# Patient Record
Sex: MALE | Race: WHITE | NOT HISPANIC OR LATINO | Employment: OTHER | URBAN - METROPOLITAN AREA
[De-identification: names, ages, dates, MRNs, and addresses within clinical notes are randomized per-mention and may not be internally consistent; named-entity substitution may affect disease eponyms.]

---

## 2024-11-09 ENCOUNTER — HOSPITAL ENCOUNTER (INPATIENT)
Facility: MEDICAL CENTER | Age: 64
LOS: 6 days | End: 2024-11-15
Attending: INTERNAL MEDICINE | Admitting: INTERNAL MEDICINE
Payer: MEDICARE

## 2024-11-09 ENCOUNTER — HOSPITAL ENCOUNTER (OUTPATIENT)
Dept: RADIOLOGY | Facility: MEDICAL CENTER | Age: 64
End: 2024-11-09

## 2024-11-09 ENCOUNTER — HOSPITAL ENCOUNTER (EMERGENCY)
Facility: MEDICAL CENTER | Age: 64
End: 2024-11-09

## 2024-11-09 DIAGNOSIS — I63.9 ACUTE STROKE DUE TO ISCHEMIA (HCC): ICD-10-CM

## 2024-11-09 DIAGNOSIS — I65.22 STENOSIS OF LEFT INTERNAL CAROTID ARTERY: ICD-10-CM

## 2024-11-09 PROBLEM — F17.200 SMOKING: Status: ACTIVE | Noted: 2024-11-09

## 2024-11-09 PROBLEM — D75.1 POLYCYTHEMIA: Status: ACTIVE | Noted: 2024-11-09

## 2024-11-09 PROBLEM — I10 PRIMARY HYPERTENSION: Status: ACTIVE | Noted: 2024-11-09

## 2024-11-09 LAB — EKG IMPRESSION: NORMAL

## 2024-11-09 PROCEDURE — 700102 HCHG RX REV CODE 250 W/ 637 OVERRIDE(OP)

## 2024-11-09 PROCEDURE — 770020 HCHG ROOM/CARE - TELE (206)

## 2024-11-09 PROCEDURE — 99406 BEHAV CHNG SMOKING 3-10 MIN: CPT | Performed by: STUDENT IN AN ORGANIZED HEALTH CARE EDUCATION/TRAINING PROGRAM

## 2024-11-09 PROCEDURE — 99223 1ST HOSP IP/OBS HIGH 75: CPT | Mod: 25,AI | Performed by: STUDENT IN AN ORGANIZED HEALTH CARE EDUCATION/TRAINING PROGRAM

## 2024-11-09 PROCEDURE — 700111 HCHG RX REV CODE 636 W/ 250 OVERRIDE (IP): Mod: JZ | Performed by: STUDENT IN AN ORGANIZED HEALTH CARE EDUCATION/TRAINING PROGRAM

## 2024-11-09 PROCEDURE — A9270 NON-COVERED ITEM OR SERVICE: HCPCS

## 2024-11-09 PROCEDURE — 93005 ELECTROCARDIOGRAM TRACING: CPT | Performed by: STUDENT IN AN ORGANIZED HEALTH CARE EDUCATION/TRAINING PROGRAM

## 2024-11-09 PROCEDURE — 700102 HCHG RX REV CODE 250 W/ 637 OVERRIDE(OP): Performed by: STUDENT IN AN ORGANIZED HEALTH CARE EDUCATION/TRAINING PROGRAM

## 2024-11-09 PROCEDURE — 93010 ELECTROCARDIOGRAM REPORT: CPT | Performed by: INTERNAL MEDICINE

## 2024-11-09 PROCEDURE — A9270 NON-COVERED ITEM OR SERVICE: HCPCS | Performed by: STUDENT IN AN ORGANIZED HEALTH CARE EDUCATION/TRAINING PROGRAM

## 2024-11-09 RX ORDER — MORPHINE SULFATE 4 MG/ML
2 INJECTION INTRAVENOUS
Status: DISCONTINUED | OUTPATIENT
Start: 2024-11-09 | End: 2024-11-15 | Stop reason: HOSPADM

## 2024-11-09 RX ORDER — AMLODIPINE BESYLATE 5 MG/1
5 TABLET ORAL
Status: DISCONTINUED | OUTPATIENT
Start: 2024-11-10 | End: 2024-11-09

## 2024-11-09 RX ORDER — ATORVASTATIN CALCIUM 40 MG/1
40 TABLET, FILM COATED ORAL EVERY EVENING
Status: DISCONTINUED | OUTPATIENT
Start: 2024-11-09 | End: 2024-11-15 | Stop reason: HOSPADM

## 2024-11-09 RX ORDER — LORAZEPAM 2 MG/ML
0.5 INJECTION INTRAMUSCULAR
Status: COMPLETED | OUTPATIENT
Start: 2024-11-09 | End: 2024-11-10

## 2024-11-09 RX ORDER — ASPIRIN 81 MG/1
81 TABLET, CHEWABLE ORAL DAILY
Status: DISCONTINUED | OUTPATIENT
Start: 2024-11-10 | End: 2024-11-15 | Stop reason: HOSPADM

## 2024-11-09 RX ORDER — AMOXICILLIN 250 MG
2 CAPSULE ORAL EVERY EVENING
Status: DISCONTINUED | OUTPATIENT
Start: 2024-11-09 | End: 2024-11-15 | Stop reason: HOSPADM

## 2024-11-09 RX ORDER — HYDRALAZINE HYDROCHLORIDE 20 MG/ML
10 INJECTION INTRAMUSCULAR; INTRAVENOUS EVERY 4 HOURS PRN
Status: DISCONTINUED | OUTPATIENT
Start: 2024-11-09 | End: 2024-11-15 | Stop reason: HOSPADM

## 2024-11-09 RX ORDER — POLYETHYLENE GLYCOL 3350 17 G/17G
1 POWDER, FOR SOLUTION ORAL
Status: DISCONTINUED | OUTPATIENT
Start: 2024-11-09 | End: 2024-11-15 | Stop reason: HOSPADM

## 2024-11-09 RX ORDER — ENOXAPARIN SODIUM 100 MG/ML
40 INJECTION SUBCUTANEOUS DAILY
Status: DISCONTINUED | OUTPATIENT
Start: 2024-11-10 | End: 2024-11-09

## 2024-11-09 RX ORDER — ASPIRIN 300 MG/1
300 SUPPOSITORY RECTAL DAILY
Status: DISCONTINUED | OUTPATIENT
Start: 2024-11-10 | End: 2024-11-15 | Stop reason: HOSPADM

## 2024-11-09 RX ORDER — ACETAMINOPHEN 325 MG/1
650 TABLET ORAL EVERY 6 HOURS PRN
Status: DISCONTINUED | OUTPATIENT
Start: 2024-11-09 | End: 2024-11-15 | Stop reason: HOSPADM

## 2024-11-09 RX ORDER — HYDRALAZINE HYDROCHLORIDE 20 MG/ML
10 INJECTION INTRAMUSCULAR; INTRAVENOUS EVERY 6 HOURS PRN
Status: DISCONTINUED | OUTPATIENT
Start: 2024-11-09 | End: 2024-11-09

## 2024-11-09 RX ORDER — OXYCODONE HYDROCHLORIDE 5 MG/1
5 TABLET ORAL
Status: DISCONTINUED | OUTPATIENT
Start: 2024-11-09 | End: 2024-11-15 | Stop reason: HOSPADM

## 2024-11-09 RX ORDER — NICOTINE 21 MG/24HR
21 PATCH, TRANSDERMAL 24 HOURS TRANSDERMAL
Status: DISCONTINUED | OUTPATIENT
Start: 2024-11-09 | End: 2024-11-15 | Stop reason: HOSPADM

## 2024-11-09 RX ORDER — OXYCODONE HYDROCHLORIDE 5 MG/1
2.5 TABLET ORAL
Status: DISCONTINUED | OUTPATIENT
Start: 2024-11-09 | End: 2024-11-15 | Stop reason: HOSPADM

## 2024-11-09 RX ORDER — NICOTINE 21 MG/24HR
21 PATCH, TRANSDERMAL 24 HOURS TRANSDERMAL
Status: DISCONTINUED | OUTPATIENT
Start: 2024-11-10 | End: 2024-11-09

## 2024-11-09 RX ORDER — ONDANSETRON 4 MG/1
4 TABLET, ORALLY DISINTEGRATING ORAL EVERY 4 HOURS PRN
Status: DISCONTINUED | OUTPATIENT
Start: 2024-11-09 | End: 2024-11-15 | Stop reason: HOSPADM

## 2024-11-09 RX ORDER — ONDANSETRON 2 MG/ML
4 INJECTION INTRAMUSCULAR; INTRAVENOUS EVERY 4 HOURS PRN
Status: DISCONTINUED | OUTPATIENT
Start: 2024-11-09 | End: 2024-11-15 | Stop reason: HOSPADM

## 2024-11-09 RX ADMIN — HYDRALAZINE HYDROCHLORIDE 10 MG: 20 INJECTION INTRAMUSCULAR; INTRAVENOUS at 20:20

## 2024-11-09 RX ADMIN — NICOTINE TRANSDERMAL SYSTEM 21 MG: 21 PATCH, EXTENDED RELEASE TRANSDERMAL at 22:44

## 2024-11-09 RX ADMIN — OXYCODONE 5 MG: 5 TABLET ORAL at 22:00

## 2024-11-09 RX ADMIN — ATORVASTATIN CALCIUM 40 MG: 40 TABLET, FILM COATED ORAL at 20:20

## 2024-11-09 ASSESSMENT — ENCOUNTER SYMPTOMS
FOCAL WEAKNESS: 1
WEAKNESS: 1

## 2024-11-09 ASSESSMENT — PAIN DESCRIPTION - PAIN TYPE
TYPE: ACUTE PAIN
TYPE: ACUTE PAIN

## 2024-11-09 NOTE — PROGRESS NOTES
[facility] DIRECT ADMISSION REPORT  Transferring facility: [place]  Transferring physician: Dr. Villa    Chief complaint: Right-sided weakness  Pertinent history & patient course: Patient began noticing right-sided weakness on Wednesday, did not come into the ER until today.  Patient has been noncompliant with his medications since his wife  a couple of months ago.  Workup for stroke was started, he was noted to have critical stenosis of his left common carotid on CTA neck.  Patient did not receive any thrombolytics due to time away from initial injury.  Patient is being transferred here for neurology consultation as well as additional workup.  Pertinent imaging & lab results: CTA neck-critical stenosis of the left common carotid artery  Consultants called prior to transfer and pertinent input from consultants: N/A  Code Status: Full per transferring provider, I personally verified with the transferring provider patient's code status and the transferring provider has confirmed this with the patient.  Reason for Transfer: Neuroconsultation and additional workup  Further work up or recommendations requested prior to transfer: N/A    Patient accepted for transfer: Yes  Accepting [facility] Facility: [facility] - Nursing to notify the Triage Coordinator in the RTOC via Voalte or Phone ext. [phone] when patient arrives to the unit. The Triage Coordinator will assign the admitting provider.    Consultants to be called upon arrival: Neurology will need to be consulted but if the patient arrives overnight, no need for overnight consultation  Admission status: Inpatient.   Floor requested: Neurology  If ICU transfer, name of intensivist case discussed with and pertinent input from critical care: N/A    The admitting provider is the point of contact for questions or concerns regarding patient's care.

## 2024-11-10 ENCOUNTER — APPOINTMENT (OUTPATIENT)
Dept: RADIOLOGY | Facility: MEDICAL CENTER | Age: 64
End: 2024-11-10
Attending: STUDENT IN AN ORGANIZED HEALTH CARE EDUCATION/TRAINING PROGRAM
Payer: MEDICARE

## 2024-11-10 PROBLEM — I65.22 STENOSIS OF LEFT INTERNAL CAROTID ARTERY: Status: ACTIVE | Noted: 2024-11-10

## 2024-11-10 LAB
ANION GAP SERPL CALC-SCNC: 9 MMOL/L (ref 7–16)
BUN SERPL-MCNC: 7 MG/DL (ref 8–22)
CALCIUM SERPL-MCNC: 10.5 MG/DL (ref 8.5–10.5)
CHLORIDE SERPL-SCNC: 104 MMOL/L (ref 96–112)
CHOLEST SERPL-MCNC: 161 MG/DL (ref 100–199)
CO2 SERPL-SCNC: 24 MMOL/L (ref 20–33)
CREAT SERPL-MCNC: 1 MG/DL (ref 0.5–1.4)
ERYTHROCYTE [DISTWIDTH] IN BLOOD BY AUTOMATED COUNT: 55.8 FL (ref 35.9–50)
EST. AVERAGE GLUCOSE BLD GHB EST-MCNC: 108 MG/DL
GFR SERPLBLD CREATININE-BSD FMLA CKD-EPI: 84 ML/MIN/1.73 M 2
GLUCOSE SERPL-MCNC: 107 MG/DL (ref 65–99)
HBA1C MFR BLD: 5.4 % (ref 4–5.6)
HCT VFR BLD AUTO: 48.6 % (ref 42–52)
HDLC SERPL-MCNC: 37 MG/DL
HGB BLD-MCNC: 16.4 G/DL (ref 14–18)
LDLC SERPL CALC-MCNC: 96 MG/DL
MCH RBC QN AUTO: 33.1 PG (ref 27–33)
MCHC RBC AUTO-ENTMCNC: 33.7 G/DL (ref 32.3–36.5)
MCV RBC AUTO: 98.2 FL (ref 81.4–97.8)
PLATELET # BLD AUTO: 123 K/UL (ref 164–446)
PMV BLD AUTO: 11.8 FL (ref 9–12.9)
POTASSIUM SERPL-SCNC: 4.5 MMOL/L (ref 3.6–5.5)
RBC # BLD AUTO: 4.95 M/UL (ref 4.7–6.1)
SODIUM SERPL-SCNC: 137 MMOL/L (ref 135–145)
TRIGL SERPL-MCNC: 138 MG/DL (ref 0–149)
WBC # BLD AUTO: 6.6 K/UL (ref 4.8–10.8)

## 2024-11-10 PROCEDURE — 80061 LIPID PANEL: CPT

## 2024-11-10 PROCEDURE — A9270 NON-COVERED ITEM OR SERVICE: HCPCS | Performed by: PSYCHIATRY & NEUROLOGY

## 2024-11-10 PROCEDURE — 85027 COMPLETE CBC AUTOMATED: CPT

## 2024-11-10 PROCEDURE — 700102 HCHG RX REV CODE 250 W/ 637 OVERRIDE(OP): Performed by: PSYCHIATRY & NEUROLOGY

## 2024-11-10 PROCEDURE — 70551 MRI BRAIN STEM W/O DYE: CPT

## 2024-11-10 PROCEDURE — 99222 1ST HOSP IP/OBS MODERATE 55: CPT | Performed by: PSYCHIATRY & NEUROLOGY

## 2024-11-10 PROCEDURE — 700111 HCHG RX REV CODE 636 W/ 250 OVERRIDE (IP): Performed by: NURSE PRACTITIONER

## 2024-11-10 PROCEDURE — 36415 COLL VENOUS BLD VENIPUNCTURE: CPT

## 2024-11-10 PROCEDURE — 700102 HCHG RX REV CODE 250 W/ 637 OVERRIDE(OP): Performed by: STUDENT IN AN ORGANIZED HEALTH CARE EDUCATION/TRAINING PROGRAM

## 2024-11-10 PROCEDURE — 83036 HEMOGLOBIN GLYCOSYLATED A1C: CPT

## 2024-11-10 PROCEDURE — 700111 HCHG RX REV CODE 636 W/ 250 OVERRIDE (IP): Mod: JZ

## 2024-11-10 PROCEDURE — 80048 BASIC METABOLIC PNL TOTAL CA: CPT

## 2024-11-10 PROCEDURE — A9270 NON-COVERED ITEM OR SERVICE: HCPCS | Performed by: STUDENT IN AN ORGANIZED HEALTH CARE EDUCATION/TRAINING PROGRAM

## 2024-11-10 PROCEDURE — 99406 BEHAV CHNG SMOKING 3-10 MIN: CPT | Performed by: STUDENT IN AN ORGANIZED HEALTH CARE EDUCATION/TRAINING PROGRAM

## 2024-11-10 PROCEDURE — 770020 HCHG ROOM/CARE - TELE (206)

## 2024-11-10 PROCEDURE — 99233 SBSQ HOSP IP/OBS HIGH 50: CPT | Mod: 25 | Performed by: STUDENT IN AN ORGANIZED HEALTH CARE EDUCATION/TRAINING PROGRAM

## 2024-11-10 RX ORDER — AMLODIPINE BESYLATE 5 MG/1
5 TABLET ORAL
Status: DISCONTINUED | OUTPATIENT
Start: 2024-11-10 | End: 2024-11-10

## 2024-11-10 RX ORDER — CLOPIDOGREL BISULFATE 75 MG/1
150 TABLET ORAL DAILY
Status: DISCONTINUED | OUTPATIENT
Start: 2024-11-10 | End: 2024-11-10

## 2024-11-10 RX ORDER — CLOPIDOGREL BISULFATE 75 MG/1
150 TABLET ORAL DAILY
Status: COMPLETED | OUTPATIENT
Start: 2024-11-10 | End: 2024-11-11

## 2024-11-10 RX ORDER — AMLODIPINE BESYLATE 5 MG/1
5 TABLET ORAL DAILY
Status: DISCONTINUED | OUTPATIENT
Start: 2024-11-10 | End: 2024-11-12

## 2024-11-10 RX ORDER — CLOPIDOGREL BISULFATE 75 MG/1
75 TABLET ORAL DAILY
Status: DISCONTINUED | OUTPATIENT
Start: 2024-11-12 | End: 2024-11-15 | Stop reason: HOSPADM

## 2024-11-10 RX ADMIN — OXYCODONE 2.5 MG: 5 TABLET ORAL at 05:03

## 2024-11-10 RX ADMIN — OXYCODONE 5 MG: 5 TABLET ORAL at 12:36

## 2024-11-10 RX ADMIN — HYDRALAZINE HYDROCHLORIDE 10 MG: 20 INJECTION INTRAMUSCULAR; INTRAVENOUS at 09:32

## 2024-11-10 RX ADMIN — ATORVASTATIN CALCIUM 40 MG: 40 TABLET, FILM COATED ORAL at 16:35

## 2024-11-10 RX ADMIN — CLOPIDOGREL BISULFATE 150 MG: 75 TABLET ORAL at 11:56

## 2024-11-10 RX ADMIN — HYDRALAZINE HYDROCHLORIDE 10 MG: 20 INJECTION INTRAMUSCULAR; INTRAVENOUS at 04:39

## 2024-11-10 RX ADMIN — AMLODIPINE BESYLATE 5 MG: 5 TABLET ORAL at 08:30

## 2024-11-10 RX ADMIN — OXYCODONE 5 MG: 5 TABLET ORAL at 20:03

## 2024-11-10 RX ADMIN — OXYCODONE 2.5 MG: 5 TABLET ORAL at 09:33

## 2024-11-10 RX ADMIN — LORAZEPAM 0.5 MG: 2 INJECTION INTRAMUSCULAR; INTRAVENOUS at 00:09

## 2024-11-10 RX ADMIN — ASPIRIN 81 MG: 81 TABLET, CHEWABLE ORAL at 05:04

## 2024-11-10 SDOH — ECONOMIC STABILITY: TRANSPORTATION INSECURITY
IN THE PAST 12 MONTHS, HAS LACK OF RELIABLE TRANSPORTATION KEPT YOU FROM MEDICAL APPOINTMENTS, MEETINGS, WORK OR FROM GETTING THINGS NEEDED FOR DAILY LIVING?: NO

## 2024-11-10 SDOH — ECONOMIC STABILITY: TRANSPORTATION INSECURITY
IN THE PAST 12 MONTHS, HAS THE LACK OF TRANSPORTATION KEPT YOU FROM MEDICAL APPOINTMENTS OR FROM GETTING MEDICATIONS?: NO

## 2024-11-10 ASSESSMENT — PATIENT HEALTH QUESTIONNAIRE - PHQ9
8. MOVING OR SPEAKING SO SLOWLY THAT OTHER PEOPLE COULD HAVE NOTICED. OR THE OPPOSITE, BEING SO FIGETY OR RESTLESS THAT YOU HAVE BEEN MOVING AROUND A LOT MORE THAN USUAL: MORE THAN HALF THE DAYS
4. FEELING TIRED OR HAVING LITTLE ENERGY: NEARLY EVERY DAY
9. THOUGHTS THAT YOU WOULD BE BETTER OFF DEAD, OR OF HURTING YOURSELF: NOT AT ALL
5. POOR APPETITE OR OVEREATING: SEVERAL DAYS
7. TROUBLE CONCENTRATING ON THINGS, SUCH AS READING THE NEWSPAPER OR WATCHING TELEVISION: NEARLY EVERY DAY
SUM OF ALL RESPONSES TO PHQ9 QUESTIONS 1 AND 2: 6
6. FEELING BAD ABOUT YOURSELF - OR THAT YOU ARE A FAILURE OR HAVE LET YOURSELF OR YOUR FAMILY DOWN: SEVERAL DAYS
1. LITTLE INTEREST OR PLEASURE IN DOING THINGS: NEARLY EVERY DAY
SUM OF ALL RESPONSES TO PHQ QUESTIONS 1-9: 19
3. TROUBLE FALLING OR STAYING ASLEEP OR SLEEPING TOO MUCH: NEARLY EVERY DAY
2. FEELING DOWN, DEPRESSED, IRRITABLE, OR HOPELESS: NEARLY EVERY DAY

## 2024-11-10 ASSESSMENT — PAIN DESCRIPTION - PAIN TYPE
TYPE: ACUTE PAIN

## 2024-11-10 ASSESSMENT — COGNITIVE AND FUNCTIONAL STATUS - GENERAL
DAILY ACTIVITIY SCORE: 21
SUGGESTED CMS G CODE MODIFIER DAILY ACTIVITY: CJ
DRESSING REGULAR UPPER BODY CLOTHING: A LITTLE
MOBILITY SCORE: 24
SUGGESTED CMS G CODE MODIFIER MOBILITY: CH
DRESSING REGULAR LOWER BODY CLOTHING: A LITTLE
HELP NEEDED FOR BATHING: A LITTLE

## 2024-11-10 ASSESSMENT — SOCIAL DETERMINANTS OF HEALTH (SDOH)
WITHIN THE PAST 12 MONTHS, YOU WORRIED THAT YOUR FOOD WOULD RUN OUT BEFORE YOU GOT THE MONEY TO BUY MORE: NEVER TRUE
WITHIN THE LAST YEAR, HAVE YOU BEEN AFRAID OF YOUR PARTNER OR EX-PARTNER?: NO
WITHIN THE LAST YEAR, HAVE YOU BEEN HUMILIATED OR EMOTIONALLY ABUSED IN OTHER WAYS BY YOUR PARTNER OR EX-PARTNER?: NO
WITHIN THE LAST YEAR, HAVE YOU BEEN KICKED, HIT, SLAPPED, OR OTHERWISE PHYSICALLY HURT BY YOUR PARTNER OR EX-PARTNER?: NO
WITHIN THE PAST 12 MONTHS, THE FOOD YOU BOUGHT JUST DIDN'T LAST AND YOU DIDN'T HAVE MONEY TO GET MORE: SOMETIMES TRUE
IN THE PAST 12 MONTHS, HAS THE ELECTRIC, GAS, OIL, OR WATER COMPANY THREATENED TO SHUT OFF SERVICE IN YOUR HOME?: NO
WITHIN THE LAST YEAR, HAVE TO BEEN RAPED OR FORCED TO HAVE ANY KIND OF SEXUAL ACTIVITY BY YOUR PARTNER OR EX-PARTNER?: NO

## 2024-11-10 ASSESSMENT — LIFESTYLE VARIABLES
EVER HAD A DRINK FIRST THING IN THE MORNING TO STEADY YOUR NERVES TO GET RID OF A HANGOVER: NO
AVERAGE NUMBER OF DAYS PER WEEK YOU HAVE A DRINK CONTAINING ALCOHOL: 0
ON A TYPICAL DAY WHEN YOU DRINK ALCOHOL HOW MANY DRINKS DO YOU HAVE: 0
ALCOHOL_USE: NO
HAVE YOU EVER FELT YOU SHOULD CUT DOWN ON YOUR DRINKING: NO
EVER FELT BAD OR GUILTY ABOUT YOUR DRINKING: NO
HOW MANY TIMES IN THE PAST YEAR HAVE YOU HAD 5 OR MORE DRINKS IN A DAY: 0
DOES PATIENT WANT TO STOP DRINKING: CANNOT ASSESS
HAVE PEOPLE ANNOYED YOU BY CRITICIZING YOUR DRINKING: NO
TOTAL SCORE: 0
CONSUMPTION TOTAL: NEGATIVE

## 2024-11-10 ASSESSMENT — ENCOUNTER SYMPTOMS
VOMITING: 0
ABDOMINAL PAIN: 0
FEVER: 0
SHORTNESS OF BREATH: 0
NAUSEA: 0

## 2024-11-10 NOTE — ASSESSMENT & PLAN NOTE
Antihypertensives were initiated but held postprocedure due to shock.  They will be restarted accordingly.

## 2024-11-10 NOTE — PROGRESS NOTES
4 Eyes Skin Assessment Completed by FIOR Cavazos and FIOR Matute.    Head WDL  Ears Redness and Blanching  Nose WDL  Mouth WDL  Neck Redness and Blanching  Breast/Chest Redness and Blanching  Shoulder Blades Redness and Blanching  Spine WDL  (R) Arm/Elbow/Hand WDL  (L) Arm/Elbow/Hand WDL  Abdomen WDL  Groin WDL  Scrotum/Coccyx/Buttocks WDL  (R) Leg WDL  (L) Leg WDL  (R) Heel/Foot/Toe Redness and Blanching, Dry, Flaky  (L) Heel/Foot/Toe Redness and Blanching, Dry, Flaky        Devices In Places Tele Box and Pulse Ox      Interventions In Place N/A    Possible Skin Injury No    Pictures Uploaded Into Epic N/A  Wound Consult Placed N/A  RN Wound Prevention Protocol Ordered No

## 2024-11-10 NOTE — HOSPITAL COURSE
Christopher Brown is a 64-year-old male with PMHx HTN, current tobacco abuse.  Admitted 11/9 for right upper extremity weakness.    Per history-patient presented to an outside facility for right upper extremity weakness that started on Wednesday 11/6.  Patient did not seek medical attention until 11/9.      CTA head showing low-density in the left parieto-occipital region suggesting an area of previous infarction.  CTA head and neck: High-grade stenosis of left distal carotid artery with moderate stenosis of the internal carotid artery.    MRI brain: Acute and subacute left posterior frontal and parietal infarcts.  No definitive evidence of hemorrhagic transformation.    Neurology was consulted upon arrival.  Neurology recommending DAPT therapy for 3 months with an end date of 2/11/2025 and then aspirin as monotherapy to follow.      Patient underwent left ICA stent on 11/12.  Postoperatively patient was monitored in the ICU.  ICU admission was complicated by bradycardia requiring epinephrine drip.  Cardiology was consulted and recommended continue telemetry monitoring.  At this point pacemaker was not recommended as patient's heart block and pauses occur overnight without symptoms.  Encourage patient to avoid beta-blocker therapy in the future as he at baseline has a heart rate of 40-50.    PT and OT recommending outpatient therapies.  Referrals have been placed.  Additionally, patient has been discharged with a 90-day supply of his medications.

## 2024-11-10 NOTE — CARE PLAN
The patient is Stable - Low risk of patient condition declining or worsening    Shift Goals  Clinical Goals: Stable neuro status, pain management  Patient Goals: Pain management  Family Goals: SUE    Progress made toward(s) clinical / shift goals:      Problem: Neuro Status  Goal: Neuro status will remain stable or improve  Outcome: Progressing    Q4H neuro checks in place. Pt's neurological status (A/Ox4) remains unchanged throughout shift. Bed alarm is on, call light within reach.     Problem: Pain - Standard  Goal: Alleviation of pain or a reduction in pain to the patient’s comfort goal  Outcome: Progressing   Frequent pain assessments throughout shift. PRN pain medications provided per MAR and pt request. Pharmacological and non-pharmacological interventions utilized.      Patient is not progressing towards the following goals:

## 2024-11-10 NOTE — H&P
Hospital Medicine History & Physical Note    Date of Service  11/9/2024    Primary Care Physician  No primary care provider on file.    Consultants  neurology    Specialist Names: Dr. Agrawal    Code Status  Full Code    Chief Complaint  RUE weakness       History of Presenting Illness  Christopher Brown is a 64 y.o. male with history of HTN, smoker, who presented 11/9/2024 as a direct admit from [facility] for RUE weakness onset this Wednesday. Patient did not seek medical attention until this morning. He presented to [facility] this morning. Labs remarkable for wbc 12.1, Hb 16.8, plt 121, Na 132, K 4.0, Cr 1.1. Troponin in normal range. UA negative.     CT head notes low density in the L parieto-occipital region suggests an area of previous infarction. CTA neck noted high grade stenosis of the L distal common carotid artery with moderate stenosis of the internal carotid artery. CTA head unremarkable.     Discussed with neurology Dr. Agrawal, BP goal 120-180 systolic. Echo, MRI ordered.     I discussed the plan of care with patient, bedside RN, and neurology .    Review of Systems  Review of Systems   Constitutional:  Positive for malaise/fatigue.   Neurological:  Positive for focal weakness and weakness.   All other systems reviewed and are negative.      Past Medical History   has no past medical history on file.    Surgical History   has no past surgical history on file.     Family History  family history is not on file.   Family history reviewed with patient. There is no family history that is pertinent to the chief complaint.     Social History       Allergies  Not on File    Medications  None       Physical Exam                             Physical Exam  Vitals and nursing note reviewed.   Constitutional:       Appearance: Normal appearance. He is ill-appearing.   HENT:      Head: Normocephalic and atraumatic.      Mouth/Throat:      Pharynx: Oropharynx is clear.   Eyes:       "Pupils: Pupils are equal, round, and reactive to light.   Neck:      Vascular: No carotid bruit.   Cardiovascular:      Rate and Rhythm: Normal rate and regular rhythm.   Pulmonary:      Effort: Pulmonary effort is normal.      Breath sounds: Normal breath sounds.   Abdominal:      General: Abdomen is flat. Bowel sounds are normal.      Palpations: Abdomen is soft. There is no mass.   Musculoskeletal:         General: Normal range of motion.      Cervical back: Neck supple.   Skin:     General: Skin is warm and dry.   Neurological:      Mental Status: He is alert and oriented to person, place, and time.      Comments: RUE: weakness 4/5 in strength with drifting. The other extremities strength intact  Sensation intact in all extremities   Psychiatric:         Mood and Affect: Mood normal.         Behavior: Behavior normal.         Laboratory:          No results for input(s): \"ALTSGPT\", \"ASTSGOT\", \"ALKPHOSPHAT\", \"TBILIRUBIN\", \"DBILIRUBIN\", \"GAMMAGT\", \"AMYLASE\", \"LIPASE\", \"ALB\", \"PREALBUMIN\", \"GLUCOSE\" in the last 72 hours.      No results for input(s): \"NTPROBNP\" in the last 72 hours.      No results for input(s): \"TROPONINT\" in the last 72 hours.    Imaging:  No orders to display       EKG:  I have personally reviewed the images and compared with prior images.    Assessment/Plan:  Justification for Admission Status  I anticipate this patient will require at least two midnights for appropriate medical management, necessitating inpatient admission because critical carotid artery stenosis, CVA requiring neurology evaluation and possible intervention.     Patient will need a Telemetry bed on NEUROLOGY service .  The need is secondary to RUE weakness.    * Acute stroke due to ischemia (HCC)- (present on admission)  Assessment & Plan  Onset on 11/6 Wednesday, presenting to hospital 11/9  CT head notes low density in the L parieto-occipital region suggests an area of previous infarction.   CTA neck noted high grade " stenosis of the L distal common carotid artery with moderate stenosis of the internal carotid artery.   CTA head unremarkable.     Check A1c, lipid profile  Echo with bubble study, MRI  Start ASA, statin  PT/OT/SLP  Discussed with neurology Dr. Agrawal. Keep Sbp goal 120-180    Polycythemia  Assessment & Plan  Hb 16.8 noted on outside labs  Will repeat  likely due to long term smoking    Smoking  Assessment & Plan  5 minutes on tobacco cessation counseling provided including nicotine patches, gum, and dangers of smoking. Discussed the risks of smoking including increased risk of heart disease, stroke, cancer and COPD. Discussed the benefits of quitting smoking.       Primary hypertension  Assessment & Plan  Unclear which home meds he is taking  Hydralazine iv prn if sbp>180        VTE prophylaxis: SCDs/TEDs

## 2024-11-10 NOTE — PROGRESS NOTES
Monitor summary: SB 45-59, CT -0.14, QRS -0.08, QT -0.43, with (F) PVCs and trigeminal PVCs per strip from the monitor room.

## 2024-11-10 NOTE — PROGRESS NOTES
Hospital Medicine Daily Progress Note    Date of Service  11/10/2024    Chief Complaint  Christopher Brown is a 64 y.o. male admitted 11/9/2024 with right upper extremity weakness.    Hospital Course  Christopher Brown is a 64-year-old male with PMHx HTN, current tobacco abuse.  Admitted 11/9 for right upper extremity weakness.    Per history-patient presented to an outside facility for right upper extremity weakness that started on Wednesday 11/6.  Patient did not seek medical attention until 11/9.      CTA head showing low-density in the left parieto-occipital region suggesting an area of previous infarction.  CTA head and neck: High-grade stenosis of left distal carotid artery with moderate stenosis of the internal carotid artery.    MRI brain: Acute and subacute left posterior frontal and parietal infarcts.  No definitive evidence of hemorrhagic transformation.    Neurology was consulted upon arrival.     Interval Problem Update  11/10: Vitals notable for SBP ranging 160-190.  Patient has required 1 dose IV antihypertensives overnight.  Start amlodipine 5 mg daily. Discussed with Dr. Agrawal, neurovascular. Planning for left ICA stenting Tuesday 11/12.    I have discussed this patient's plan of care and discharge plan at IDT rounds today with Case Management, Nursing, Nursing leadership, and other members of the IDT team.    Consultants/Specialty  neurology    Code Status  Full Code    Disposition  The patient is not medically cleared for discharge to home or a post-acute facility.      I have placed the appropriate orders for post-discharge needs.    Review of Systems  Review of Systems   Constitutional:  Negative for fever and malaise/fatigue.   Respiratory:  Negative for shortness of breath.    Cardiovascular:  Negative for chest pain and leg swelling.   Gastrointestinal:  Negative for abdominal pain, nausea and vomiting.        Physical Exam  Temp:  [36.4 °C (97.5 °F)-36.7 °C (98.1 °F)] 36.7 °C (98.1 °F)  Pulse:   [51-70] 70  Resp:  [17-18] 18  BP: (142-199)/(69-98) 142/74  SpO2:  [93 %-98 %] 98 %    Physical Exam  Vitals and nursing note reviewed.   Constitutional:       General: He is not in acute distress.     Appearance: Normal appearance. He is not ill-appearing.   Cardiovascular:      Rate and Rhythm: Normal rate and regular rhythm.      Heart sounds: Murmur heard.   Pulmonary:      Effort: Pulmonary effort is normal.      Breath sounds: Normal breath sounds.   Skin:     General: Skin is warm and dry.   Neurological:      Mental Status: He is alert and oriented to person, place, and time. Mental status is at baseline.      Comments: RUE 3/5 flexion strength; 2/5  strength   Psychiatric:         Mood and Affect: Mood normal.         Behavior: Behavior normal.         Fluids  No intake or output data in the 24 hours ending 11/10/24 1433     Laboratory  Recent Labs     11/10/24  0640   WBC 6.6   RBC 4.95   HEMOGLOBIN 16.4   HEMATOCRIT 48.6   MCV 98.2*   MCH 33.1*   MCHC 33.7   RDW 55.8*   PLATELETCT 123*   MPV 11.8     Recent Labs     11/10/24  0640   SODIUM 137   POTASSIUM 4.5   CHLORIDE 104   CO2 24   GLUCOSE 107*   BUN 7*   CREATININE 1.00   CALCIUM 10.5             Recent Labs     11/10/24  0640   TRIGLYCERIDE 138   HDL 37*   LDL 96       Imaging  MR-BRAIN-W/O   Final Result      1.  Acute to subacute left posterior frontal and parietal infarcts. No definite evidence of hemorrhagic transformation.   2.  Mild diffuse cerebral and cerebellar substance loss.   3.  Mild microangiopathic ischemic change versus demyelination or gliosis.   4.  Chronic ischemic pontine gliosis.      EC-ECHOCARDIOGRAM COMPLETE W/ CONT    (Results Pending)        Assessment/Plan  * Acute stroke due to ischemia (HCC)- (present on admission)  Assessment & Plan  Onset on 11/6 Wednesday, presenting to hospital 11/9  CT head notes low density in the L parieto-occipital region suggests an area of previous infarction.   CTA head and neck noted  high grade stenosis of the L distal common carotid artery with moderate stenosis of the internal carotid artery.   - Continue DAPT with clopdiogrel 150mg until 11/11 and then regular DAPT for 3 months (end date 2/11/2025)  - Continue Atorvastatin  - SBP goal   - Neurology following  - PT OT pending      Stenosis of left internal carotid artery  Assessment & Plan  Neurovascular surgery is following  Discussed with Dr. Agrawal  - Plan for stenting 11/12  - NPO at [place] on 11/11    Polycythemia  Assessment & Plan  Hb 16.4  - Continue to monitor     Smoking  Assessment & Plan  5 minutes on tobacco cessation counseling provided including nicotine patches, gum, and dangers of smoking. Discussed the risks of smoking including increased risk of heart disease, stroke, cancer and COPD. Discussed the benefits of quitting smoking.       Primary hypertension  Assessment & Plan  -190 overnight  - Start amlodpine 5mg daily  - Close monitoring of blood pressures  - IV antihypertensives for SBP >180  - Low threshold to add additional PO antihypertensives          VTE prophylaxis:    enoxaparin ppx      I have performed a physical exam and reviewed and updated ROS and Plan today (11/10/2024). In review of yesterday's note (11/9/2024), there are no changes except as documented above.

## 2024-11-10 NOTE — CONSULTS
Neurology STROKE H&P  Neurohospitalist Service, [facility] Neurosciences    Referring Physician: Caitlin Cobos M.D.    STROKE:   Right upper extremity weakness.          HPI: Christopher Brown is a pleasant 64 y.o. right-handed male with past medical history significant for hypertension who was transferred from [facility] for evaluation of right upper extremity weakness associated with left carotid stenosis.  Apparently his symptoms started on Wednesday, however he did not seek medical attention until yesterday when he went for evaluation.  He felt he slept wrong on his right hand and that is why had to weakness.  He underwent brain CT which revealed hypodensities in left parietal-occipital head region and subsequently MRI confirmed acute to subacute left posterior frontal and parietal infarct with no evidence of hemorrhage.  CT of the head and neck revealed high-grade left carotid stenosis.    Review of systems: In addition to what is detailed in the HPI above, all other systems reviewed and are negative.    Past Medical History:    has no past medical history on file.    FHx:  family history is not on file.    SHx:       Allergies:  Not on File    Medications:    Current Facility-Administered Medications:     amLODIPine (Norvasc) tablet 5 mg, 5 mg, Oral, DAILY, Caitlin Cobos M.D., 5 mg at 11/10/24 0830    clopidogrel (Plavix) tablet 150 mg, 150 mg, Oral, DAILY **FOLLOWED BY** [START ON 11/12/2024] clopidogrel (Plavix) tablet 75 mg, 75 mg, Oral, DAILY, Leonard Agrawal M.D.    acetaminophen (Tylenol) tablet 650 mg, 650 mg, Oral, Q6HRS PRN, Angela Mares M.D.    Notify provider if pain remains uncontrolled, , , CONTINUOUS **AND** Use the Numeric Rating Scale (NRS), Chaidez-Baker Faces (WBF), or FLACC on regular floors and Critical-Care Pain Observation Tool (CPOT) on ICUs/Trauma to assess pain, , , CONTINUOUS **AND** Pulse Ox, , , CONTINUOUS **AND** Pharmacy Consult Request ...Pain  Management Review 1 Each, 1 Each, Other, PHARMACY TO DOSE **AND** If patient difficult to arouse and/or has respiratory depression (respiratory rate of 10 or less), stop any opiates that are currently infusing and call a Rapid Response., , , CONTINUOUS, Angela Mares M.D.    oxyCODONE immediate-release (Roxicodone) tablet 2.5 mg, 2.5 mg, Oral, Q3HRS PRN, 2.5 mg at 11/10/24 0933 **OR** oxyCODONE immediate-release (Roxicodone) tablet 5 mg, 5 mg, Oral, Q3HRS PRN, 5 mg at 11/09/24 2200 **OR** morphine 4 MG/ML injection 2 mg, 2 mg, Intravenous, Q3HRS PRN, Angela Mares M.D.    senna-docusate (Pericolace Or Senokot S) 8.6-50 MG per tablet 2 Tablet, 2 Tablet, Oral, Q EVENING **AND** polyethylene glycol/lytes (Miralax) Packet 1 Packet, 1 Packet, Oral, QDAY PRN, Angela Mares M.D.    ondansetron (Zofran) syringe/vial injection 4 mg, 4 mg, Intravenous, Q4HRS PRN, Angela Mares M.D.    ondansetron (Zofran ODT) dispertab 4 mg, 4 mg, Oral, Q4HRS PRN, Angela Mares M.D.    atorvastatin (Lipitor) tablet 40 mg, 40 mg, Oral, Q EVENING, Angela Mares M.D., 40 mg at 11/09/24 2020    aspirin (Asa) chewable tab 81 mg, 81 mg, Oral, DAILY, 81 mg at 11/10/24 0504 **OR** aspirin (Asa) suppository 300 mg, 300 mg, Rectal, DAILY, Angela Mares M.D.    hydrALAZINE (Apresoline) injection 10 mg, 10 mg, Intravenous, Q4HRS PRN, MARLO PichardoN.P., 10 mg at 11/10/24 0932    nicotine (Nicoderm) 21 MG/24HR 21 mg, 21 mg, Transdermal, Daily-0600, 21 mg at 11/09/24 2244 **AND** Nicotine Replacement Patient Education Materials, , , Once **AND** nicotine polacrilex (Nicorette) 2 MG piece 2 mg, 2 mg, Oral, Q HOUR PRN, Chhaya Plasencia D.N.P.    Physical Examination:    Vitals:    11/10/24 0812 11/10/24 0830 11/10/24 0933 11/10/24 1046   BP: (!) 197/90 (!) 199/91 (!) 193/93 (!) 178/89   Pulse: (!) 51      Resp: 18  17    Temp: 36.7 °C (98.1 °F)      TempSrc: Temporal      SpO2: 97%  96% 96%   Weight:       Height:           General:   Patient is awake and in  no acute distress  Neck: Full range of motion  Eyes: Midline, Pupils reactive to light.  CV: RRR  Lungs: No respiratory distress  Extremities: No cyanosis, warm, no significant edema.    NEUROLOGICAL EXAM:   Mental status: Awake, alert and fully oriented, follows commands  Speech and language: speech is fluent and not dysarthric. The patient is able to name and repeat.  Cranial nerve exam: Pupils are equal, round and reactive to light bilaterally. Visual fields are full. Extraocular muscles are intact. Sensation in the face is intact to light touch. Face is symmetric. Hearing to finger rub equal. Palate elevates symmetrically. Shoulder shrug is full. Tongue is midline.  Motor exam: Sustain antigravity in all 4 extremities with no downward drift, although he has profound right hand  weakness. Tone is normal. No abnormal movements were seen on exam.  Sensory exam: No sensory deficits identified   Coordination: no gross ataxia noted on exam  Plantar reflexes: Equivocal  Gait: deferred    NIH Stroke Scale:    1a. Level of Consciousness (Alert, drowsy, etc): 0= Alert    1b. LOC Questions (Month, age): 0= Answers both correctly    1c. LOC Commands (Open/close eyes make fist/let go): 0= Obeys both correctly    2.   Best Gaze (Eyes open - patient follows examiner's finger on face): 0= Normal    3.   Visual Fields (introduce visual stimulus/threat to patient's field quadrants): 0= No visual loss  4.   Facial Paresis (Show teeth, raise eyebrows and squeeze eyes shut): 0= Normal     5a. Motor Arm - Left (Elevate arm to 90 degrees if patient is sitting, 45 degrees if  supine): 0= No drift    5b. Motor Arm - Right (Elevate arm to 90 degrees if patient is sitting, 45 degrees if supine): 1= Drift    6a. Motor Leg - Left (Elevate leg 30 degrees with patient supine): 0= No drift    6b. Motor Leg - Right  (Elevate leg 30 degrees with patient supine): 0= No drift    7.   Limb Ataxia (Finger-nose, heel down shin): 0= No  "ataxia    8.   Sensory (Pin prick to face, arm, trunk and leg - compare side to side): 0= Normal    9.  Best Language (Name item, describe a picture and read sentences): 0= No aphasia    10. Dysarthria (Evaluate speech clarity by patient repeating listed words): 0= Normal articulation    11. Extinction and Inattention (Use information from prior testing to identify neglect or  double simultaneous stimuli testing): 0= No neglect    Total NIH Score: 1    Baseline modified [place] Scale (MRS): 0 = No symptoms    Objective Data:    Labs:  No results found for: \"PROTHROMBTM\", \"INR\"   Lab Results   Component Value Date/Time    WBC 6.6 11/10/2024 06:40 AM    RBC 4.95 11/10/2024 06:40 AM    HEMOGLOBIN 16.4 11/10/2024 06:40 AM    HEMATOCRIT 48.6 11/10/2024 06:40 AM    MCV 98.2 (H) 11/10/2024 06:40 AM    MCH 33.1 (H) 11/10/2024 06:40 AM    MCHC 33.7 11/10/2024 06:40 AM    MPV 11.8 11/10/2024 06:40 AM      Lab Results   Component Value Date/Time    SODIUM 137 11/10/2024 06:40 AM    POTASSIUM 4.5 11/10/2024 06:40 AM    CHLORIDE 104 11/10/2024 06:40 AM    CO2 24 11/10/2024 06:40 AM    GLUCOSE 107 (H) 11/10/2024 06:40 AM    BUN 7 (L) 11/10/2024 06:40 AM    CREATININE 1.00 11/10/2024 06:40 AM      Lab Results   Component Value Date/Time    CHOLSTRLTOT 161 11/10/2024 06:40 AM    LDL 96 11/10/2024 06:40 AM    HDL 37 (A) 11/10/2024 06:40 AM    TRIGLYCERIDE 138 11/10/2024 06:40 AM       No results found for: \"ALKPHOSPHAT\", \"ASTSGOT\", \"ALTSGPT\", \"TBILIRUBIN\"   Lab Results   Component Value Date/Time    HBA1C 5.4 11/10/2024 06:40 AM       Imaging/Testing:    I interpreted and/or reviewed the patient's neuroimaging    MR-BRAIN-W/O   Final Result      1.  Acute to subacute left posterior frontal and parietal infarcts. No definite evidence of hemorrhagic transformation.   2.  Mild diffuse cerebral and cerebellar substance loss.   3.  Mild microangiopathic ischemic change versus demyelination or gliosis.   4.  Chronic ischemic pontine " gliosis.      EC-ECHOCARDIOGRAM COMPLETE W/ CONT    (Results Pending)       Assessment:  Christopher Brown is a 64 y.o. right-handed male with past medical history significant for hypertension who was [facility] for evaluation of right upper extremity weakness associated with left carotid stenosis.  Apparently his symptoms started on Wednesday, however he did not seek medical attention until yesterday when he went for evaluation.  He felt he slept wrong on his right hand and that is why had to weakness.  He underwent brain CT which revealed hypodensities in left parietal-occipital head region and subsequently MRI confirmed acute to subacute left posterior frontal and parietal infarct with no evidence of hemorrhage.  CT of the head and neck revealed high-grade left carotid stenosis.  He is not a candidate for acute stroke intervention.  Discussed with Dr. Canada, neuro-IR with plan for left carotid stenting on Tuesday.      Plan:  -q4h and PRN neuro assessment. VS per nursing/unit protocol.   -SBP goal is 120-180 prior to intervention.  -Telemetry; currently SR. Screen for Afib/arrhythmia. Obtain TTE.  -ASA 81 mg PO q day  -Plavix 150 mg daily for 2 days prior to intervention, followed by aspirin 81 and Plavix 75 mg daily for 3 months or per IR recommendation after stent placement.  -Atorvastatin 80 mg PO q HS. Check lipid panel.  Goal LDL less than 70  -Recommend aggressive BG management per primary team. Avoid IVF with Dextrose. -BG goal . Check hemoglobin A1c.  Goal < 7  -PT/OT/SLP eval and treat for early mobilization.  -Counseled patient at length regarding life style and risk factor modification for secondary stroke prevention.   -All other medical management per primary team.   -DVT PPX: Okay with Lovenox     The plan of care above has been discussed with Dr. Canada and Dr. Caitlin Cobos M.D.        Please note that this dictation was created using voice recognition  software. I have made every reasonable attempt to correct obvious errors, but I expect that there are errors of grammar and possibly content that I did not discover before finalizing the note.       Leonard Agrawal MD  [facility] Neurology Services

## 2024-11-10 NOTE — ASSESSMENT & PLAN NOTE
Onset on 11/6 Wednesday, presenting to hospital 11/9  CT head notes low density in the L parieto-occipital region suggests an area of previous infarction.   CTA head and neck noted high grade stenosis of the L distal common carotid artery with moderate stenosis of the internal carotid artery.   MRI of the brain confirmed acute and subacute left posterior frontal and parietal infarcts.  No evidence of hemorrhagic transformation.  Carotid stent placed 11/12/2024.  - Continue DAPT with clopdiogrel and aspirin and DAPT for 3 months (end date 2/11/2025) then aspirin monotherapy  - Continue Atorvastatin    - PT OT ST  Echocardiogram reveals a normal ejection fraction no evidence of thrombus.  He has not been in afib

## 2024-11-10 NOTE — THERAPY
Physical Therapy Contact Note    Patient Name: Christopher Brown  Age:  64 y.o., Sex:  male  Medical Record #: [id number]  Today's Date: 11/10/2024    PT consult received. Pt is pending IR for left carotid stenting on Tuesday 11/12. Per basil and RN, pt has been mobilizing to the bathroom without issue. Will hold PT eval until post-procedure for most accurate d/c recs and pt edu.

## 2024-11-10 NOTE — CARE PLAN
The patient is Stable - Low risk of patient condition declining or worsening    Shift Goals  Clinical Goals: Monitor Neuro Status, MRI  Patient Goals: Water to drink  Family Goals: SUE    Progress made toward(s) clinical / shift goals:    Problem: Knowledge Deficit - Stroke Education  Goal: Patient's knowledge of stroke and risk factors will improve  Outcome: Progressing   Plan of care discussed with patient. Patient informed of pending MRI. MRI screening performed. Stroke Education Guide reviewed and given to patient. Patient educated on the different types of strokes, signs and symptoms of a stroke, and risk factors including high blood pressure, high cholesterol, non-compliance with medication, previous TIA or stroke, and tobacco use. All questions and concerns answered at this time. Hourly rounding in place.     Problem: Neuro Status  Goal: Neuro status will remain stable or improve  Outcome: Progressing  Q4 hour neuro checks in place. Patient's neurological status (A/O x4) remains stable and unchanged throughout shift. Patient is able to move all extremities and denies any numbness or tingling. NIHSS in place and performed per provider orders.      Problem: Pain - Standard  Goal: Alleviation of pain or a reduction in pain to the patient’s comfort goal  Outcome: Progressing  Frequent pain reassessments performed throughout shift. PRN Pharmacological interventions administered per MAR and patient request. PRN Non-pharmacological interventions available. Patient states pain relief obtained with administration of prn pharmacological intervention, and able to rest comfortably.     Patient is not progressing towards the following goals:

## 2024-11-10 NOTE — ASSESSMENT & PLAN NOTE
Neurovascular surgery is following  Discussed with Dr. Agrawal  - Plan for stenting 11/12  - N.p.o. for stent placement today  - Will transfer to ICU postoperatively

## 2024-11-11 ENCOUNTER — APPOINTMENT (OUTPATIENT)
Dept: CARDIOLOGY | Facility: MEDICAL CENTER | Age: 64
End: 2024-11-11
Attending: STUDENT IN AN ORGANIZED HEALTH CARE EDUCATION/TRAINING PROGRAM
Payer: MEDICARE

## 2024-11-11 LAB
ALBUMIN SERPL BCP-MCNC: 4.3 G/DL (ref 3.2–4.9)
ALBUMIN/GLOB SERPL: 1.4 G/DL
ALP SERPL-CCNC: 103 U/L (ref 30–99)
ALT SERPL-CCNC: 19 U/L (ref 2–50)
ANION GAP SERPL CALC-SCNC: 12 MMOL/L (ref 7–16)
AST SERPL-CCNC: 23 U/L (ref 12–45)
BILIRUB SERPL-MCNC: 0.9 MG/DL (ref 0.1–1.5)
BUN SERPL-MCNC: 13 MG/DL (ref 8–22)
CALCIUM ALBUM COR SERPL-MCNC: 11 MG/DL (ref 8.5–10.5)
CALCIUM SERPL-MCNC: 11.2 MG/DL (ref 8.5–10.5)
CHLORIDE SERPL-SCNC: 103 MMOL/L (ref 96–112)
CO2 SERPL-SCNC: 21 MMOL/L (ref 20–33)
CREAT SERPL-MCNC: 1.09 MG/DL (ref 0.5–1.4)
ERYTHROCYTE [DISTWIDTH] IN BLOOD BY AUTOMATED COUNT: 54.8 FL (ref 35.9–50)
GFR SERPLBLD CREATININE-BSD FMLA CKD-EPI: 75 ML/MIN/1.73 M 2
GLOBULIN SER CALC-MCNC: 3 G/DL (ref 1.9–3.5)
GLUCOSE SERPL-MCNC: 101 MG/DL (ref 65–99)
HCT VFR BLD AUTO: 51 % (ref 42–52)
HGB BLD-MCNC: 17.7 G/DL (ref 14–18)
LV EJECT FRACT  99904: 55
LV EJECT FRACT MOD 2C 99903: 49.41
LV EJECT FRACT MOD 4C 99902: 54.02
LV EJECT FRACT MOD BP 99901: 52.76
MCH RBC QN AUTO: 33.8 PG (ref 27–33)
MCHC RBC AUTO-ENTMCNC: 34.7 G/DL (ref 32.3–36.5)
MCV RBC AUTO: 97.5 FL (ref 81.4–97.8)
PLATELET # BLD AUTO: 143 K/UL (ref 164–446)
PMV BLD AUTO: 11.6 FL (ref 9–12.9)
POTASSIUM SERPL-SCNC: 3.8 MMOL/L (ref 3.6–5.5)
PROT SERPL-MCNC: 7.3 G/DL (ref 6–8.2)
RBC # BLD AUTO: 5.23 M/UL (ref 4.7–6.1)
SODIUM SERPL-SCNC: 136 MMOL/L (ref 135–145)
WBC # BLD AUTO: 10 K/UL (ref 4.8–10.8)

## 2024-11-11 PROCEDURE — 93306 TTE W/DOPPLER COMPLETE: CPT

## 2024-11-11 PROCEDURE — 770020 HCHG ROOM/CARE - TELE (206)

## 2024-11-11 PROCEDURE — 80053 COMPREHEN METABOLIC PANEL: CPT

## 2024-11-11 PROCEDURE — 700102 HCHG RX REV CODE 250 W/ 637 OVERRIDE(OP): Performed by: NURSE PRACTITIONER

## 2024-11-11 PROCEDURE — A9270 NON-COVERED ITEM OR SERVICE: HCPCS | Performed by: PSYCHIATRY & NEUROLOGY

## 2024-11-11 PROCEDURE — 85027 COMPLETE CBC AUTOMATED: CPT

## 2024-11-11 PROCEDURE — 700102 HCHG RX REV CODE 250 W/ 637 OVERRIDE(OP): Performed by: STUDENT IN AN ORGANIZED HEALTH CARE EDUCATION/TRAINING PROGRAM

## 2024-11-11 PROCEDURE — A9270 NON-COVERED ITEM OR SERVICE: HCPCS | Performed by: STUDENT IN AN ORGANIZED HEALTH CARE EDUCATION/TRAINING PROGRAM

## 2024-11-11 PROCEDURE — A9270 NON-COVERED ITEM OR SERVICE: HCPCS

## 2024-11-11 PROCEDURE — 97535 SELF CARE MNGMENT TRAINING: CPT

## 2024-11-11 PROCEDURE — 700111 HCHG RX REV CODE 636 W/ 250 OVERRIDE (IP): Mod: JZ | Performed by: STUDENT IN AN ORGANIZED HEALTH CARE EDUCATION/TRAINING PROGRAM

## 2024-11-11 PROCEDURE — 99232 SBSQ HOSP IP/OBS MODERATE 35: CPT | Performed by: STUDENT IN AN ORGANIZED HEALTH CARE EDUCATION/TRAINING PROGRAM

## 2024-11-11 PROCEDURE — 700102 HCHG RX REV CODE 250 W/ 637 OVERRIDE(OP)

## 2024-11-11 PROCEDURE — A9270 NON-COVERED ITEM OR SERVICE: HCPCS | Performed by: NURSE PRACTITIONER

## 2024-11-11 PROCEDURE — 93306 TTE W/DOPPLER COMPLETE: CPT | Mod: 26 | Performed by: INTERNAL MEDICINE

## 2024-11-11 PROCEDURE — 97166 OT EVAL MOD COMPLEX 45 MIN: CPT

## 2024-11-11 PROCEDURE — 700102 HCHG RX REV CODE 250 W/ 637 OVERRIDE(OP): Performed by: PSYCHIATRY & NEUROLOGY

## 2024-11-11 PROCEDURE — 36415 COLL VENOUS BLD VENIPUNCTURE: CPT

## 2024-11-11 PROCEDURE — 99232 SBSQ HOSP IP/OBS MODERATE 35: CPT | Performed by: PSYCHIATRY & NEUROLOGY

## 2024-11-11 RX ORDER — LORAZEPAM 1 MG/1
0.5 TABLET ORAL ONCE
Status: COMPLETED | OUTPATIENT
Start: 2024-11-11 | End: 2024-11-11

## 2024-11-11 RX ADMIN — ASPIRIN 81 MG: 81 TABLET, CHEWABLE ORAL at 04:36

## 2024-11-11 RX ADMIN — NICOTINE TRANSDERMAL SYSTEM 21 MG: 21 PATCH, EXTENDED RELEASE TRANSDERMAL at 04:36

## 2024-11-11 RX ADMIN — OXYCODONE 5 MG: 5 TABLET ORAL at 16:12

## 2024-11-11 RX ADMIN — LORAZEPAM 0.5 MG: 1 TABLET ORAL at 19:57

## 2024-11-11 RX ADMIN — CLOPIDOGREL BISULFATE 150 MG: 75 TABLET ORAL at 04:36

## 2024-11-11 RX ADMIN — MORPHINE SULFATE 2 MG: 4 INJECTION, SOLUTION INTRAMUSCULAR; INTRAVENOUS at 17:56

## 2024-11-11 RX ADMIN — ATORVASTATIN CALCIUM 40 MG: 40 TABLET, FILM COATED ORAL at 16:12

## 2024-11-11 RX ADMIN — SENNOSIDES AND DOCUSATE SODIUM 2 TABLET: 50; 8.6 TABLET ORAL at 16:11

## 2024-11-11 RX ADMIN — AMLODIPINE BESYLATE 5 MG: 5 TABLET ORAL at 04:36

## 2024-11-11 ASSESSMENT — PAIN DESCRIPTION - PAIN TYPE
TYPE: ACUTE PAIN

## 2024-11-11 ASSESSMENT — ENCOUNTER SYMPTOMS
SHORTNESS OF BREATH: 0
ABDOMINAL PAIN: 0
CHILLS: 0
WEAKNESS: 0
VOMITING: 0
COUGH: 0
SENSORY CHANGE: 0
NAUSEA: 0
HEADACHES: 0
FEVER: 0
FOCAL WEAKNESS: 1
TINGLING: 0
SPEECH CHANGE: 0

## 2024-11-11 ASSESSMENT — ACTIVITIES OF DAILY LIVING (ADL): TOILETING: INDEPENDENT

## 2024-11-11 ASSESSMENT — COGNITIVE AND FUNCTIONAL STATUS - GENERAL
DAILY ACTIVITIY SCORE: 21
DRESSING REGULAR UPPER BODY CLOTHING: A LITTLE
DRESSING REGULAR LOWER BODY CLOTHING: A LITTLE
HELP NEEDED FOR BATHING: A LITTLE
SUGGESTED CMS G CODE MODIFIER DAILY ACTIVITY: CJ

## 2024-11-11 NOTE — CARE PLAN
The patient is Stable - Low risk of patient condition declining or worsening    Shift Goals  Clinical Goals: Stable neuro status, pain management  Patient Goals: Pain management  Family Goals: SUE    Progress made toward(s) clinical / shift goals:      Problem: Neuro Status  Goal: Neuro status will remain stable or improve  Outcome: Progressing    Q4H neuro checks in place. Pt's neurological status (A/Ox4) remains unchanged throughout shift. Bed alarm is on, call light within reach.     Problem: Pain - Standard  Goal: Alleviation of pain or a reduction in pain to the patient’s comfort goal  Outcome: Progressing   Frequent pain assessments throughout shift. PRN pain medications provided per MAR and pt request. Pharmacological and non-pharmacological interventions utilized.      Problem: Hemodynamic Monitoring  Goal: Patient's hemodynamics, fluid balance and neurologic status will be stable or improve  Outcome: Progressing   Pt's blood pressure closely monitored throughout shift. PRN blood pressure medications available to keep SBP within set parameters.        Patient is not progressing towards the following goals:

## 2024-11-11 NOTE — PROGRESS NOTES
Monitor summary: SB/SR 50-80, NV -0.16, QRS -0.08, QT -0.47, with (F) PVCs, couplets and trigem per strip from the monitor room

## 2024-11-11 NOTE — PROGRESS NOTES
Hospital Medicine Daily Progress Note    Date of Service  11/11/2024    Chief Complaint  Christopher Brown is a 64 y.o. male admitted 11/9/2024 with right upper extremity weakness.    Hospital Course  Christopher Brown is a 64-year-old male with PMHx HTN, current tobacco abuse.  Admitted 11/9 for right upper extremity weakness.    Per history-patient presented to an outside facility for right upper extremity weakness that started on Wednesday 11/6.  Patient did not seek medical attention until 11/9.      CTA head showing low-density in the left parieto-occipital region suggesting an area of previous infarction.  CTA head and neck: High-grade stenosis of left distal carotid artery with moderate stenosis of the internal carotid artery.    MRI brain: Acute and subacute left posterior frontal and parietal infarcts.  No definitive evidence of hemorrhagic transformation.    Neurology was consulted upon arrival.     Interval Problem Update  11/10: Vitals notable for SBP ranging 160-190.  Patient has required 1 dose IV antihypertensives overnight.  Start amlodipine 5 mg daily. Discussed with Dr. Agrawal, neurovascular. Planning for left ICA stenting Tuesday 11/12.    11/11: Vitals notable for SBP ranging 99 through 158.  N.p.o. at midnight for left ICA stenting tomorrow. Echo pending for CVA work up.     I have discussed this patient's plan of care and discharge plan at IDT rounds today with Case Management, Nursing, Nursing leadership, and other members of the IDT team.    Consultants/Specialty  neurology    Code Status  Full Code    Disposition  The patient is not medically cleared for discharge to home or a post-acute facility.      I have placed the appropriate orders for post-discharge needs.    Review of Systems  Review of Systems   Constitutional:  Negative for fever and malaise/fatigue.   Respiratory:  Negative for shortness of breath.    Cardiovascular:  Negative for chest pain and leg swelling.   Gastrointestinal:   Negative for abdominal pain, nausea and vomiting.        Physical Exam  Temp:  [36.5 °C (97.7 °F)-36.8 °C (98.2 °F)] 36.7 °C (98.1 °F)  Pulse:  [60-70] 63  Resp:  [17-18] 18  BP: ()/(82-99) 160/88  SpO2:  [92 %-98 %] 97 %    Physical Exam  Vitals and nursing note reviewed.   Constitutional:       General: He is not in acute distress.     Appearance: Normal appearance. He is not ill-appearing.   Cardiovascular:      Rate and Rhythm: Normal rate and regular rhythm.      Heart sounds: Murmur heard.   Pulmonary:      Effort: Pulmonary effort is normal.      Breath sounds: Normal breath sounds.   Skin:     General: Skin is warm and dry.   Neurological:      Mental Status: He is alert and oriented to person, place, and time. Mental status is at baseline.      Comments: RUE 3/5 flexion strength; 2/5  strength   Psychiatric:         Mood and Affect: Mood normal.         Behavior: Behavior normal.         Fluids    Intake/Output Summary (Last 24 hours) at 11/11/2024 1301  Last data filed at 11/11/2024 1000  Gross per 24 hour   Intake 480 ml   Output --   Net 480 ml        Laboratory  Recent Labs     11/10/24  0640 11/11/24  0754   WBC 6.6 10.0   RBC 4.95 5.23   HEMOGLOBIN 16.4 17.7   HEMATOCRIT 48.6 51.0   MCV 98.2* 97.5   MCH 33.1* 33.8*   MCHC 33.7 34.7   RDW 55.8* 54.8*   PLATELETCT 123* 143*   MPV 11.8 11.6     Recent Labs     11/10/24  0640 11/11/24  0754   SODIUM 137 136   POTASSIUM 4.5 3.8   CHLORIDE 104 103   CO2 24 21   GLUCOSE 107* 101*   BUN 7* 13   CREATININE 1.00 1.09   CALCIUM 10.5 11.2*             Recent Labs     11/10/24  0640   TRIGLYCERIDE 138   HDL 37*   LDL 96       Imaging  MR-BRAIN-W/O   Final Result      1.  Acute to subacute left posterior frontal and parietal infarcts. No definite evidence of hemorrhagic transformation.   2.  Mild diffuse cerebral and cerebellar substance loss.   3.  Mild microangiopathic ischemic change versus demyelination or gliosis.   4.  Chronic ischemic pontine  gliosis.      EC-ECHOCARDIOGRAM COMPLETE W/ CONT    (Results Pending)        Assessment/Plan  * Acute stroke due to ischemia (HCC)- (present on admission)  Assessment & Plan  Onset on 11/6 Wednesday, presenting to hospital 11/9  CT head notes low density in the L parieto-occipital region suggests an area of previous infarction.   CTA head and neck noted high grade stenosis of the L distal common carotid artery with moderate stenosis of the internal carotid artery.   - Continue DAPT with clopdiogrel 150mg until 11/11 and then regular DAPT for 3 months (end date 2/11/2025)  - Continue Atorvastatin  - SBP goal   - Neurology following  - PT OT pending    -Echo pending    Stenosis of left internal carotid artery  Assessment & Plan  Neurovascular surgery is following  Discussed with Dr. Agrawal  - Plan for stenting 11/12  - N.p.o. at midnight for stent placement tomorrow    Polycythemia  Assessment & Plan  Hb 16.4  - Continue to monitor     Smoking  Assessment & Plan  5 minutes on tobacco cessation counseling provided including nicotine patches, gum, and dangers of smoking. Discussed the risks of smoking including increased risk of heart disease, stroke, cancer and COPD. Discussed the benefits of quitting smoking.       Primary hypertension  Assessment & Plan  SBP improving; 99 through 158 overnight  -Continue amlodpine 5mg daily; started on 11/10  - Close monitoring of blood pressures  - IV antihypertensives for SBP >180  - Low threshold to add additional PO antihypertensives          VTE prophylaxis:   SCDs/TEDs      I have performed a physical exam and reviewed and updated ROS and Plan today (11/11/2024). In review of yesterday's note (11/10/2024), there are no changes except as documented above.

## 2024-11-11 NOTE — THERAPY
Occupational Therapy   Initial Evaluation     Patient Name: Christopher Brown  Age:  64 y.o., Sex:  male  Medical Record #: [id number]  Today's Date: 11/11/2024     Precautions  Precautions: Fall Risk    Assessment    Patient is 64 y.o. male admitted for RUE weakness/sensation changes, left carotid stenosis pending ICA stent. Pt normally independent with functional mobility and ADLs living alone in a [facility]. Pt able to complete LB dressing with Gabbie due to RUE weakness and needing assistance with tying. Will continue to see for skilled therapy while admitted as well as recommend post-acute placement. Pt educated on neuroplasticity principles, RUE strengthening and coordination activities to perform while still admitted, as well as provided with built up  for RUE to use with utensils to maximize independence.      Plan    Occupational Therapy Initial Treatment Plan   Treatment Interventions: Self Care / Activities of Daily Living, Adaptive Equipment, Manual Therapy Techniques, Neuro Re-Education / Balance, Therapeutic Exercises, Therapeutic Activity, Sensory Integration Techniques  Treatment Frequency: 4 Times per Week  Duration: Until Therapy Goals Met    DC Equipment Recommendations: None  Discharge Recommendations: Recommend outpatient occupational therapy services to address higher level deficits     Objective       11/11/24 0844   Prior Living Situation   Prior Services None   Housing / Facility 1 Story House   Steps Into Home 6   Rail Both Rail (Steps into Home)   Bathroom Set up Walk In Shower   Equipment Owned Front-Wheel Walker   Lives with - Patient's Self Care Capacity Alone and Able to Care For Self   Prior Level of ADL Function   Self Feeding Independent   Grooming / Hygiene Independent   Bathing Independent   Dressing Independent   Toileting Independent   Prior Level of IADL Function   Medication Management Independent   Laundry Independent   Kitchen Mobility Independent   Finances Independent   Home  Management Independent   Shopping Independent   Prior Level Of Mobility Independent Without Device in Community   Driving / Transportation Driving Independent   Occupation (Pre-Hospital Vocational) Retired Due To Disability   History of Falls   History of Falls No   Precautions   Precautions Fall Risk   Vitals   O2 Delivery Device None - Room Air   Pain 0 - 10 Group   Therapist Pain Assessment Post Activity Pain Same as Prior to Activity;Nurse Notified   Cognition    Cognition / Consciousness WDL   Level of Consciousness Alert   Active ROM Upper Body   Active ROM Upper Body  WDL   Dominant Hand Right   Strength Upper Body   Upper Body Strength  X   Comments RUE 3+/5, LUE WFL   Sensation Upper Body   Upper Extremity Sensation  X   Rt Upper Extremity Light Touch Impaired   Upper Body Muscle Tone   Upper Body Muscle Tone  X   Rt Upper Extremity Muscle Tone Hypotonic   Neurological Concerns   Neurological Concerns Yes   Rt Upper Extremity Gross Motor Control Impaired   Rt Upper Extremity Fine Motor Control Impaired   Rt Upper Extremity Functional Use Impaired   Right In Hand Manipulation Impaired   Coordination Upper Body   Coordination X   Fine Motor Coordination RUE impaired   Balance Assessment   Sitting Balance (Static) Fair   Sitting Balance (Dynamic) Fair   Standing Balance (Static) Fair   Standing Balance (Dynamic) Fair   Weight Shift Sitting Fair   Weight Shift Standing Fair   Comments no AD   Bed Mobility    Supine to Sit Supervised   Sit to Supine Supervised   Scooting Supervised   ADL Assessment   Eating   (Discussed adaptive equipment provided built up  to improve independence)   Upper Body Dressing Supervision   Lower Body Dressing Minimal Assist   Toileting   (NT-refused need)   How much help from another person does the patient currently need...   Putting on and taking off regular lower body clothing? 3   Bathing (including washing, rinsing, and drying)? 3   Toileting, which includes using a toilet,  bedpan, or urinal? 4   Putting on and taking off regular upper body clothing? 3   Taking care of personal grooming such as brushing teeth? 4   Eating meals? 4   6 Clicks Daily Activity Score 21   mRS Prior to admission   Prior to admission mRS 0   Modified [place] (mRS)   Modified [place] Score 1   Functional Mobility   Sit to Stand Supervised   Bed, Chair, Wheelchair Transfer Supervised   Transfer Method Stand Step   Mobility bed mobility, seated ADLs, mobility in room, back to bed   Comments no AD   Visual Perception   Visual Perception  WDL   Activity Tolerance   Sitting Edge of Bed 8 min   Standing 10 min   Short Term Goals   Short Term Goal # 1 Pt will complete LB dressing with supervision including tying pants/shoes   Short Term Goal # 2 Pt will complete standing G/H with supervision   Education Group   Education Provided Role of Occupational Therapist;Stroke;Activities of Daily Living   Role of Occupational Therapist Patient Response Patient;Acceptance;Explanation   Stroke Patient Response Patient;Acceptance;Explanation;Action Demonstration   ADL Patient Response Patient;Acceptance;Demonstration;Explanation;Action Demonstration   Occupational Therapy Initial Treatment Plan    Treatment Interventions Self Care / Activities of Daily Living;Adaptive Equipment;Manual Therapy Techniques;Neuro Re-Education / Balance;Therapeutic Exercises;Therapeutic Activity;Sensory Integration Techniques   Treatment Frequency 4 Times per Week   Duration Until Therapy Goals Met   Problem List   Problem List Decreased Active Daily Living Skills;Decreased Homemaking Skills;Decreased Upper Extremity Strength Right;Decreased Functional Mobility;Decreased Activity Tolerance;Impaired Coordination Right Upper Extremity;Impaired Sensation Right Upper Extremity;Impaired Cognitive Function;Impaired Postural Control / Balance;Impaired Upper Extremity Tone Right   Anticipated Discharge Equipment and Recommendations   DC Equipment Recommendations  None   Discharge Recommendations Recommend outpatient occupational therapy services to address higher level deficits   Interdisciplinary Plan of Care Collaboration   IDT Collaboration with  Nursing   Patient Position at End of Therapy In Bed;Bed Alarm On;Call Light within Reach;Tray Table within Reach;Phone within Reach   Collaboration Comments RN updated

## 2024-11-11 NOTE — PROGRESS NOTES
Monitor Summary: SB/SR 49-63, VA 0.18, QRS 0.10, QT 0.39, with frequent PVCs and occasional bigeminy/trigeminy/couplet per strip from monitor room.

## 2024-11-11 NOTE — CONSULTS
Neuro Interventional Radiology Consult  Author: FATEMEH Mayorga Date & Time created: 11/11/2024  4:51 PM   Date of admission  11/9/2024  Note to reader: this note follows the APSO format rather than the historical SOAP format. Assessment and plan located at the top of the note for ease of use.    Chief Complaint  64 y.o. male admitted 11/9/2024 with right upper extremity weakness    HPI  Patient is a 64 y.o. male with PMH of HTN and current tobacco abuse admitted 11/9/2024 with right upper extremity weakness. Patient initially presented to an outside facility for right upper extremity weakness that started on Wednesday 11/6.  Patient did not seek medical attention until 11/9. CTA head showed low-density in the left parieto-occipital region suggesting an area of previous infarction.  CTA head and neck: High-grade stenosis of left distal carotid artery with moderate stenosis of the internal carotid artery. MRI brain: Acute and subacute left posterior frontal and parietal infarcts.  No definitive evidence of hemorrhagic transformation. Neurology was consulted upon arrival. [facility] was consulted for Left ICA stenting.    - Neuro IR Silverio Canada MD has reviewed Patient's history and imaging studies. I examined the patient, and discussed treatment options. I have reviewed patient's most recent labs I reviewed today's labs: WBC 10 ; Hgb 17.7, Cr 1.09;   Patient is a candidate for LEFT ICA STENT PLACEMENT for this symptomatic moderate stenosis of the internal carotid artery. We discussed the method of the procedure at length including the possibility of the use of catheters, contrast, and xrays to facilitate diagnostic procedures and stents to perform endovascular intervention. We additionally discussed the procedure risks, including bleeding and infection, damage to the arteries, reaction to any medications given during the procedure, side effects of contrast, radiation exposure, in stent stenosis, mechanical  failure, stroke, hemorrhage, and death. There is a risk for unanticipated neurologic or non neurologic complications. There is a chance that the Left ICA may ultimately not be amenable to endovascular intervention. After the procedure, there is a chance that reocclusion of the stent(s) may occur.   We discussed alternatives to the procedure including surveillance and surgical intervention, which could be discussed with a neuro surgeon. The patient verbalizes understanding of risks, benefits, and alternatives and elects to proceed.      Patient has been scheduled for LEFT ICA STENT PLACEMENT with conscious sedation on 11/12/24.     This surgery will be higher risk surgery due to risk of intracranial hemorrhage or thrombotic events. Care provided will be in the Intensive Care Unit. The care needed cannot be provided in outpatient setting and without such care there is high risk of development of complications and death.      Assessment/Plan     Principal Problem:    Acute stroke due to ischemia (HCC)  Active Problems:    Primary hypertension    Smoking    Polycythemia    Stenosis of left internal carotid artery      Plan IR  - Patient loaded with Plavix 150 mg 11/11/24 at 0436 and 150 mg 11/10/24 at 1156.  - Patient has been taking ASA 81 mg daily since 11/10/24 at 0504 - discussed with Dr. Canada.  - LICA Stent Placement with sedation 11/12/24  - NPO at 2359 tonight (11/11/24).  - TEG ordered to ensure Plavix response prior to procedure.  - Neuro Checks per unit protocol  - Post procedure groin access site instructions: no lifting greater than 5 lbs, no straining, and no baths/ swimming/ soaking in tub for 7-10 days. Shower OK. OK to change dressings/band aid as needed.  - Continue ASA 81 mg daily and Plavix 75 mg daily for 3 months S/P procedure  - follow up appointment in 2-4 weeks with OP Neuro IR. Our office will call to schedule (Order not yet placed.)  - Patient to RICU following procedure.    Thank you for  allowing Interventional Radiology team to participate in the patients care, if any additonal care or requests are needed in the future please do not hesitate call or place [facility] order.        Review of Systems  Physical Exam   Review of Systems   Constitutional:  Negative for chills, fever and malaise/fatigue.   Respiratory:  Negative for cough and shortness of breath.    Cardiovascular:  Negative for chest pain.   Gastrointestinal:  Negative for abdominal pain, nausea and vomiting.   Neurological:  Positive for focal weakness (right upper extremity decreased  strength). Negative for tingling, sensory change, speech change, weakness and headaches.      Vitals:    11/11/24 1612   BP:    Pulse:    Resp: 18   Temp:    SpO2: 98%      Physical Exam  Vitals and nursing note reviewed.   Constitutional:       General: He is not in acute distress.  Cardiovascular:      Rate and Rhythm: Normal rate.   Pulmonary:      Effort: Pulmonary effort is normal. No respiratory distress.   Abdominal:      Palpations: Abdomen is soft.      Tenderness: There is no abdominal tenderness.   Skin:     General: Skin is warm and dry.   Neurological:      General: No focal deficit present.      Mental Status: He is alert and oriented to person, place, and time.      Comments: Aox4  PERRLA, no gaze deficits  Face symmetrical  Shoulder shrug intact  Sustained antigravity with no downward drift in all extremities; right hand  weakness.  Sensation intact   Psychiatric:         Mood and Affect: Mood normal.         Behavior: Behavior normal.             Labs reviewed by me today    Recent Labs     11/10/24  0640 11/11/24  0754   WBC 6.6 10.0   RBC 4.95 5.23   HEMOGLOBIN 16.4 17.7   HEMATOCRIT 48.6 51.0   MCV 98.2* 97.5   MCH 33.1* 33.8*   MCHC 33.7 34.7   RDW 55.8* 54.8*   PLATELETCT 123* 143*   MPV 11.8 11.6     Recent Labs     11/10/24  0640 11/11/24  0754   SODIUM 137 136   POTASSIUM 4.5 3.8   CHLORIDE 104 103   CO2 24 21   GLUCOSE 107* 101*  "  BUN 7* 13   CREATININE 1.00 1.09   CALCIUM 10.5 11.2*     EC-ECHOCARDIOGRAM COMPLETE W/O CONT         MR-BRAIN-W/O   Final Result      1.  Acute to subacute left posterior frontal and parietal infarcts. No definite evidence of hemorrhagic transformation.   2.  Mild diffuse cerebral and cerebellar substance loss.   3.  Mild microangiopathic ischemic change versus demyelination or gliosis.   4.  Chronic ischemic pontine gliosis.        Recent Labs     11/10/24  0640 11/11/24  0754   SODIUM 137 136   POTASSIUM 4.5 3.8   CHLORIDE 104 103   CO2 24 21   GLUCOSE 107* 101*   BUN 7* 13     No results found for: \"INR\"  No results found for: \"POCINR\"     Intake/Output Summary (Last 24 hours) at 11/11/2024 1416  Last data filed at 11/11/2024 1000  Gross per 24 hour   Intake 480 ml   Output --   Net 480 ml      Labs not explicitly included in this progress note were reviewed by the author. Radiology/imaging not explicitly included in this progress note was reviewed by the author.     No past medical history on file.     Home Medications    Not on File       I have performed a physical exam and reviewed and updated ROS and Plan today (11/11/2024).     50 minutes in directly providing and coordinating care and extensive data review.  No time overlap and excludes procedures.   "

## 2024-11-11 NOTE — THERAPY
Speech Language Therapy Contact Note    Patient Name: Christopher Brown  Age:  64 y.o., Sex:  male  Medical Record #: [id number]  Today's Date: 11/11/2024 11/11/24 1251   Treatment Variance   Reason For Missed Therapy Non-Medical - Patient Refused   Interdisciplinary Plan of Care Collaboration   IDT Collaboration with  Nursing   Collaboration Comments Orders received for a clinical swallow evaluation. Patient on a regular diet and RN reported pt is tolerating current diet without concerns. Will cancel orders for swallow eval at this time. Please re-consult with change in status or new concerns for aspiration. Consider placing orders for a cognitive evaluation.

## 2024-11-11 NOTE — CARE PLAN
The patient is Stable - Low risk of patient condition declining or worsening    Shift Goals  Clinical Goals: Monitor Neuro Status, Pain Management  Patient Goals: Pain Management  Family Goals: SUE    Progress made toward(s) clinical / shift goals:    Problem: Neuro Status  Goal: Neuro status will remain stable or improve  Outcome: Progressing   Q4 hour neuro checks in place. Patient's neurological status (A/Ox4) remains stable and unchanged throughout shift. Patient moves all extremities.     Problem: Hemodynamic Monitoring  Goal: Patient's hemodynamics, fluid balance and neurologic status will be stable or improve  Outcome: Progressing  Systolic blood pressure has remained stable and within parameter goals of systolic blood pressure <180. Hydralazine PRN available for systolic blood pressure >180.      Problem: Mobility - Stroke  Goal: Patient's capacity to carry out activities will improve  Outcome: Progressing  Patient is able to reposition himself independently with minimal assistance from staff. Patient has a steady gait when ambulating. Patient calls appropriately when needing to ambulate to the restroom. Treaded socks in use when ambulating. Bed locked and in lowest position. Bed alarm in use.     Problem: Pain - Standard  Goal: Alleviation of pain or a reduction in pain to the patient’s comfort goal  Outcome: Progressing  Frequent pain reassessments throughout shift. PRN pharmacological and non-pharmacological interventions available per MAR and patient request. 0-10 numerical pain scale in use for pain assessment.     Patient is not progressing towards the following goals:

## 2024-11-11 NOTE — PROGRESS NOTES
Neurology Progress Note  Neurohospitalist Service, [facility]    Referring Physician: Caitlin Cobos M.D.    No chief complaint on file.      HPI: Refer to initial documented Neurology H&P, as detailed in the patient's chart.    Interval History: No acute events overnight.  No new complaints, no significant changes.  Right hand weakness has slightly improved.  Patient is anxious about his procedure tomorrow.    Review of systems: In addition to what is detailed in the HPI and/or updated in the interval history, all other systems reviewed and are negative.    Past Medical History:    has no past medical history on file.    FHx:  family history is not on file.    SHx:   reports that he has been smoking cigarettes. He started smoking about 59 years ago. He has a 89.8 pack-year smoking history. He has never used smokeless tobacco.    Medications:    Current Facility-Administered Medications:     amLODIPine (Norvasc) tablet 5 mg, 5 mg, Oral, DAILY, Caitlin Cobos M.D., 5 mg at 11/11/24 0436    [COMPLETED] clopidogrel (Plavix) tablet 150 mg, 150 mg, Oral, DAILY, 150 mg at 11/11/24 0436 **FOLLOWED BY** [START ON 11/12/2024] clopidogrel (Plavix) tablet 75 mg, 75 mg, Oral, DAILY, Leonard Agrawal M.D.    acetaminophen (Tylenol) tablet 650 mg, 650 mg, Oral, Q6HRS PRN, Angela Mares M.D.    Notify provider if pain remains uncontrolled, , , CONTINUOUS **AND** Use the Numeric Rating Scale (NRS), Chaidez-Baker Faces (WBF), or FLACC on regular floors and Critical-Care Pain Observation Tool (CPOT) on ICUs/Trauma to assess pain, , , CONTINUOUS **AND** Pulse Ox, , , CONTINUOUS **AND** Pharmacy Consult Request ...Pain Management Review 1 Each, 1 Each, Other, PHARMACY TO DOSE **AND** If patient difficult to arouse and/or has respiratory depression (respiratory rate of 10 or less), stop any opiates that are currently infusing and call a Rapid Response., , , CONTINUOUS, Angela Mares M.D.    oxyCODONE  immediate-release (Roxicodone) tablet 2.5 mg, 2.5 mg, Oral, Q3HRS PRN, 2.5 mg at 11/10/24 0933 **OR** oxyCODONE immediate-release (Roxicodone) tablet 5 mg, 5 mg, Oral, Q3HRS PRN, 5 mg at 11/10/24 2003 **OR** morphine 4 MG/ML injection 2 mg, 2 mg, Intravenous, Q3HRS PRN, Angela Mares M.D.    senna-docusate (Pericolace Or Senokot S) 8.6-50 MG per tablet 2 Tablet, 2 Tablet, Oral, Q EVENING **AND** polyethylene glycol/lytes (Miralax) Packet 1 Packet, 1 Packet, Oral, QDAY PRN, Angela Mares M.D.    ondansetron (Zofran) syringe/vial injection 4 mg, 4 mg, Intravenous, Q4HRS PRN, Angela Mares M.D.    ondansetron (Zofran ODT) dispertab 4 mg, 4 mg, Oral, Q4HRS PRN, Angela Mares M.D.    atorvastatin (Lipitor) tablet 40 mg, 40 mg, Oral, Q EVENING, Angela Mares M.D., 40 mg at 11/10/24 1635    aspirin (Asa) chewable tab 81 mg, 81 mg, Oral, DAILY, 81 mg at 11/11/24 0436 **OR** aspirin (Asa) suppository 300 mg, 300 mg, Rectal, DAILY, Angela Mares M.D.    hydrALAZINE (Apresoline) injection 10 mg, 10 mg, Intravenous, Q4HRS PRN, MARLO PichardoN.P., 10 mg at 11/10/24 0932    nicotine (Nicoderm) 21 MG/24HR 21 mg, 21 mg, Transdermal, Daily-0600, 21 mg at 11/11/24 0436 **AND** Nicotine Replacement Patient Education Materials, , , Once **AND** nicotine polacrilex (Nicorette) 2 MG piece 2 mg, 2 mg, Oral, Q HOUR PRN, ARIELLE Pichardo.N.P.    Physical Examination:    Vitals:    11/11/24 0338 11/11/24 0436 11/11/24 0724 11/11/24 1133   BP: 113/83 (!) 158/94 (!) 155/97 (!) 160/88   Pulse: 60  70 63   Resp: 18  18 18   Temp: 36.6 °C (97.9 °F)  36.6 °C (97.9 °F) 36.7 °C (98.1 °F)   TempSrc: Temporal  Temporal Temporal   SpO2: 93%  95% 97%   Weight: 98.9 kg (218 lb 0.6 oz)      Height:           NEUROLOGICAL EXAM:   Mental status: Awake, alert and fully oriented, follows commands  Speech and language: speech is fluent and not dysarthric. The patient is able to name and repeat.  Cranial nerve exam: Pupils are equal, round and reactive to  "light bilaterally. Visual fields are full. Extraocular muscles are intact. Sensation in the face is intact to light touch. Face is symmetric. Hearing to finger rub equal. Palate elevates symmetrically. Shoulder shrug is full. Tongue is midline.  Motor exam: Sustain antigravity in all 4 extremities with no downward drift, although he has profound right hand  weakness. Tone is normal. No abnormal movements were seen on exam.  Sensory exam: No sensory deficits identified   Coordination: no gross ataxia noted on exam  Plantar reflexes: Equivocal  Gait: deferred    Objective Data:    Labs:  No results found for: \"PROTHROMBTM\", \"INR\"   Lab Results   Component Value Date/Time    WBC 10.0 11/11/2024 07:54 AM    RBC 5.23 11/11/2024 07:54 AM    HEMOGLOBIN 17.7 11/11/2024 07:54 AM    HEMATOCRIT 51.0 11/11/2024 07:54 AM    MCV 97.5 11/11/2024 07:54 AM    MCH 33.8 (H) 11/11/2024 07:54 AM    MCHC 34.7 11/11/2024 07:54 AM    MPV 11.6 11/11/2024 07:54 AM      Lab Results   Component Value Date/Time    SODIUM 136 11/11/2024 07:54 AM    POTASSIUM 3.8 11/11/2024 07:54 AM    CHLORIDE 103 11/11/2024 07:54 AM    CO2 21 11/11/2024 07:54 AM    GLUCOSE 101 (H) 11/11/2024 07:54 AM    BUN 13 11/11/2024 07:54 AM    CREATININE 1.09 11/11/2024 07:54 AM      Lab Results   Component Value Date/Time    CHOLSTRLTOT 161 11/10/2024 06:40 AM    LDL 96 11/10/2024 06:40 AM    HDL 37 (A) 11/10/2024 06:40 AM    TRIGLYCERIDE 138 11/10/2024 06:40 AM       Lab Results   Component Value Date/Time    ALKPHOSPHAT 103 (H) 11/11/2024 07:54 AM    ASTSGOT 23 11/11/2024 07:54 AM    ALTSGPT 19 11/11/2024 07:54 AM    TBILIRUBIN 0.9 11/11/2024 07:54 AM        Imaging/Testing:    I interpreted and/or reviewed the patient's neuroimaging    MR-BRAIN-W/O   Final Result      1.  Acute to subacute left posterior frontal and parietal infarcts. No definite evidence of hemorrhagic transformation.   2.  Mild diffuse cerebral and cerebellar substance loss.   3.  Mild " microangiopathic ischemic change versus demyelination or gliosis.   4.  Chronic ischemic pontine gliosis.      EC-ECHOCARDIOGRAM COMPLETE W/ CONT    (Results Pending)       Assessment and Plan:  Christopher Brown is a 64 y.o. right-handed male with past medical history significant for hypertension who was transferred from [facility] for evaluation of right upper extremity weakness associated with left carotid stenosis.  Onset of symptom 4-5 days ago.  He underwent brain CT which revealed hypodensities in left parietal-occipital head region and subsequently MRI confirmed acute to subacute left posterior frontal and parietal infarct with no evidence of hemorrhage.  CT of the head and neck revealed high-grade left carotid stenosis.  Discussed with Dr. Canada, neuro-IR with plan for left carotid stenting tomorrow, 11/12/2024.     Plan:  -q4h and PRN neuro assessment. VS per nursing/unit protocol.   -SBP goal is 120-180 prior to intervention.  -To IR tomorrow 11/12/2024, please keep n.p.o. after midnight tonight  -Telemetry; currently SR. Screen for Afib/arrhythmia. Obtain TTE.  -ASA 81 mg PO q day  -Plavix 150 mg daily for 2 days prior to intervention, followed by aspirin 81 and Plavix 75 mg daily for 3 months or per IR recommendation after stent placement.  -Atorvastatin 80 mg PO q HS.  LDL is 96.  Goal LDL less than 70  -Recommend aggressive BG management per primary team. Avoid IVF with Dextrose. -BG goal .  Hemoglobin A1c is 5.4.  Goal < 7  -PT/OT/SLP eval and treat for early mobilization.  -Counseled patient at length regarding life style and risk factor modification for secondary stroke prevention.   -All other medical management per primary team.   -DVT PPX: Okay with Lovenox      Please note that this dictation was created using voice recognition software. I have made every reasonable attempt to correct obvious errors, but I expect that there are errors of grammar and possibly content that I  did not discover before finalizing the note.      Leonard Agrawal MD  [facility]ute [facility] Neurology Services

## 2024-11-11 NOTE — DISCHARGE PLANNING
[facility] Transitional Care Coordination     Referral from: Dr. Mares  Insurance Provider on Facesheet: [facility]  Potential Rehab Diagnosis: Stroke     Chart review indicates patient may have on going medical management and may have therapy needs to possibly meet inpatient rehab facility criteria with the goal of returning to community.     D/C support: daughter     Physiatry consultation pended per protocol.   Would appreciate therapy evaluations as appropriate  TCC will follow      Thank you for the referral.

## 2024-11-12 ENCOUNTER — APPOINTMENT (OUTPATIENT)
Dept: RADIOLOGY | Facility: MEDICAL CENTER | Age: 64
End: 2024-11-12
Attending: PSYCHIATRY & NEUROLOGY
Payer: MEDICARE

## 2024-11-12 PROBLEM — D69.6 THROMBOCYTOPENIA (HCC): Status: ACTIVE | Noted: 2024-11-12

## 2024-11-12 LAB
ANION GAP SERPL CALC-SCNC: 10 MMOL/L (ref 7–16)
BUN SERPL-MCNC: 15 MG/DL (ref 8–22)
CALCIUM SERPL-MCNC: 10.9 MG/DL (ref 8.5–10.5)
CFT BLD TEG: 7.1 MIN (ref 4.6–9.1)
CFT P HPASE BLD TEG: 7.1 MIN (ref 4.3–8.3)
CHLORIDE SERPL-SCNC: 103 MMOL/L (ref 96–112)
CLOT ANGLE BLD TEG: 69.6 DEGREES (ref 63–78)
CLOT LYSIS 30M P MA LENFR BLD TEG: 0.3 % (ref 0–2.6)
CO2 SERPL-SCNC: 24 MMOL/L (ref 20–33)
CREAT SERPL-MCNC: 1.04 MG/DL (ref 0.5–1.4)
CT.EXTRINSIC BLD ROTEM: 1.8 MIN (ref 0.8–2.1)
ERYTHROCYTE [DISTWIDTH] IN BLOOD BY AUTOMATED COUNT: 53.2 FL (ref 35.9–50)
GFR SERPLBLD CREATININE-BSD FMLA CKD-EPI: 80 ML/MIN/1.73 M 2
GLUCOSE SERPL-MCNC: 112 MG/DL (ref 65–99)
HCT VFR BLD AUTO: 47.8 % (ref 42–52)
HGB BLD-MCNC: 16.8 G/DL (ref 14–18)
MAGNESIUM SERPL-MCNC: 2.2 MG/DL (ref 1.5–2.5)
MCF BLD TEG: 54.5 MM (ref 52–69)
MCF.PLATELET INHIB BLD ROTEM: 18 MM (ref 15–32)
MCH RBC QN AUTO: 34.1 PG (ref 27–33)
MCHC RBC AUTO-ENTMCNC: 35.1 G/DL (ref 32.3–36.5)
MCV RBC AUTO: 97.2 FL (ref 81.4–97.8)
PA AA BLD-ACNC: 21.5 % (ref 0–11)
PA ADP BLD-ACNC: 40.3 % (ref 0–17)
PLATELET # BLD AUTO: 123 K/UL (ref 164–446)
PMV BLD AUTO: 11.7 FL (ref 9–12.9)
POTASSIUM SERPL-SCNC: 4.1 MMOL/L (ref 3.6–5.5)
RBC # BLD AUTO: 4.92 M/UL (ref 4.7–6.1)
SODIUM SERPL-SCNC: 137 MMOL/L (ref 135–145)
TEG ALGORITHM TGALG: ABNORMAL
WBC # BLD AUTO: 8.9 K/UL (ref 4.8–10.8)

## 2024-11-12 PROCEDURE — 37215 TRANSCATH STENT CCA W/EPS: CPT

## 2024-11-12 PROCEDURE — 85027 COMPLETE CBC AUTOMATED: CPT

## 2024-11-12 PROCEDURE — 85384 FIBRINOGEN ACTIVITY: CPT | Mod: 91

## 2024-11-12 PROCEDURE — 93005 ELECTROCARDIOGRAM TRACING: CPT | Performed by: EMERGENCY MEDICINE

## 2024-11-12 PROCEDURE — 037L3DZ DILATION OF LEFT INTERNAL CAROTID ARTERY WITH INTRALUMINAL DEVICE, PERCUTANEOUS APPROACH: ICD-10-PCS | Performed by: RADIOLOGY

## 2024-11-12 PROCEDURE — A9270 NON-COVERED ITEM OR SERVICE: HCPCS

## 2024-11-12 PROCEDURE — 700111 HCHG RX REV CODE 636 W/ 250 OVERRIDE (IP): Performed by: NURSE PRACTITIONER

## 2024-11-12 PROCEDURE — 160002 HCHG RECOVERY MINUTES (STAT)

## 2024-11-12 PROCEDURE — 85347 COAGULATION TIME ACTIVATED: CPT

## 2024-11-12 PROCEDURE — 80048 BASIC METABOLIC PNL TOTAL CA: CPT

## 2024-11-12 PROCEDURE — 770022 HCHG ROOM/CARE - ICU (200)

## 2024-11-12 PROCEDURE — 83735 ASSAY OF MAGNESIUM: CPT

## 2024-11-12 PROCEDURE — 700102 HCHG RX REV CODE 250 W/ 637 OVERRIDE(OP)

## 2024-11-12 PROCEDURE — 85576 BLOOD PLATELET AGGREGATION: CPT | Mod: 91

## 2024-11-12 PROCEDURE — 700105 HCHG RX REV CODE 258: Performed by: NURSE PRACTITIONER

## 2024-11-12 PROCEDURE — A9270 NON-COVERED ITEM OR SERVICE: HCPCS | Performed by: PSYCHIATRY & NEUROLOGY

## 2024-11-12 PROCEDURE — A9270 NON-COVERED ITEM OR SERVICE: HCPCS | Performed by: STUDENT IN AN ORGANIZED HEALTH CARE EDUCATION/TRAINING PROGRAM

## 2024-11-12 PROCEDURE — 99291 CRITICAL CARE FIRST HOUR: CPT | Performed by: EMERGENCY MEDICINE

## 2024-11-12 PROCEDURE — 700102 HCHG RX REV CODE 250 W/ 637 OVERRIDE(OP): Performed by: STUDENT IN AN ORGANIZED HEALTH CARE EDUCATION/TRAINING PROGRAM

## 2024-11-12 PROCEDURE — 99232 SBSQ HOSP IP/OBS MODERATE 35: CPT | Performed by: STUDENT IN AN ORGANIZED HEALTH CARE EDUCATION/TRAINING PROGRAM

## 2024-11-12 PROCEDURE — 700111 HCHG RX REV CODE 636 W/ 250 OVERRIDE (IP): Mod: JZ

## 2024-11-12 PROCEDURE — 99232 SBSQ HOSP IP/OBS MODERATE 35: CPT | Performed by: PSYCHIATRY & NEUROLOGY

## 2024-11-12 PROCEDURE — 160035 HCHG PACU - 1ST 60 MINS PHASE I

## 2024-11-12 PROCEDURE — 700102 HCHG RX REV CODE 250 W/ 637 OVERRIDE(OP): Performed by: PSYCHIATRY & NEUROLOGY

## 2024-11-12 PROCEDURE — 700117 HCHG RX CONTRAST REV CODE 255: Performed by: RADIOLOGY

## 2024-11-12 PROCEDURE — 700111 HCHG RX REV CODE 636 W/ 250 OVERRIDE (IP): Performed by: RADIOLOGY

## 2024-11-12 PROCEDURE — 700101 HCHG RX REV CODE 250: Performed by: EMERGENCY MEDICINE

## 2024-11-12 RX ORDER — NICARDIPINE HYDROCHLORIDE 0.1 MG/ML
0-15 INJECTION INTRAVENOUS CONTINUOUS
Status: DISCONTINUED | OUTPATIENT
Start: 2024-11-12 | End: 2024-11-14

## 2024-11-12 RX ORDER — PHENYLEPHRINE HCL IN 0.9% NACL 1 MG/10 ML
SYRINGE (ML) INTRAVENOUS
Status: COMPLETED | OUTPATIENT
Start: 2024-11-12 | End: 2024-11-12

## 2024-11-12 RX ORDER — MIDAZOLAM HYDROCHLORIDE 1 MG/ML
.5-2 INJECTION INTRAMUSCULAR; INTRAVENOUS PRN
Status: DISCONTINUED | OUTPATIENT
Start: 2024-11-12 | End: 2024-11-12 | Stop reason: HOSPADM

## 2024-11-12 RX ORDER — NOREPINEPHRINE BITARTRATE 0.03 MG/ML
.01-.3 INJECTION, SOLUTION INTRAVENOUS CONTINUOUS
Status: DISCONTINUED | OUTPATIENT
Start: 2024-11-12 | End: 2024-11-14

## 2024-11-12 RX ORDER — HEPARIN SODIUM 1000 [USP'U]/ML
INJECTION, SOLUTION INTRAVENOUS; SUBCUTANEOUS
Status: COMPLETED
Start: 2024-11-12 | End: 2024-11-12

## 2024-11-12 RX ORDER — MIDAZOLAM HYDROCHLORIDE 1 MG/ML
INJECTION INTRAMUSCULAR; INTRAVENOUS
Status: COMPLETED
Start: 2024-11-12 | End: 2024-11-12

## 2024-11-12 RX ORDER — SODIUM CHLORIDE 9 MG/ML
500 INJECTION, SOLUTION INTRAVENOUS
Status: DISCONTINUED | OUTPATIENT
Start: 2024-11-12 | End: 2024-11-12 | Stop reason: HOSPADM

## 2024-11-12 RX ORDER — HEPARIN SODIUM 1000 [USP'U]/ML
INJECTION, SOLUTION INTRAVENOUS; SUBCUTANEOUS
Status: COMPLETED | OUTPATIENT
Start: 2024-11-12 | End: 2024-11-12

## 2024-11-12 RX ORDER — ONDANSETRON 2 MG/ML
4 INJECTION INTRAMUSCULAR; INTRAVENOUS PRN
Status: DISCONTINUED | OUTPATIENT
Start: 2024-11-12 | End: 2024-11-12 | Stop reason: HOSPADM

## 2024-11-12 RX ORDER — PHENYLEPHRINE HCL IN 0.9% NACL 1 MG/10 ML
SYRINGE (ML) INTRAVENOUS
Status: DISPENSED
Start: 2024-11-12 | End: 2024-11-13

## 2024-11-12 RX ORDER — EPINEPHRINE HCL IN 0.9 % NACL 4MG/250ML
0-.5 PLASTIC BAG, INJECTION (ML) INTRAVENOUS CONTINUOUS
Status: DISCONTINUED | OUTPATIENT
Start: 2024-11-12 | End: 2024-11-14

## 2024-11-12 RX ADMIN — ACETAMINOPHEN 650 MG: 325 TABLET ORAL at 21:23

## 2024-11-12 RX ADMIN — CLOPIDOGREL BISULFATE 75 MG: 75 TABLET ORAL at 05:32

## 2024-11-12 RX ADMIN — MIDAZOLAM HYDROCHLORIDE 1 MG: 1 INJECTION, SOLUTION INTRAMUSCULAR; INTRAVENOUS at 12:50

## 2024-11-12 RX ADMIN — HEPARIN SODIUM 5000 UNITS: 1000 INJECTION, SOLUTION INTRAVENOUS; SUBCUTANEOUS at 12:58

## 2024-11-12 RX ADMIN — AMLODIPINE BESYLATE 5 MG: 5 TABLET ORAL at 05:32

## 2024-11-12 RX ADMIN — Medication 200 MCG: at 13:25

## 2024-11-12 RX ADMIN — EPINEPHRINE 0.01 MCG/KG/MIN: 1 INJECTION INTRAMUSCULAR; INTRAVENOUS; SUBCUTANEOUS at 22:59

## 2024-11-12 RX ADMIN — ATORVASTATIN CALCIUM 40 MG: 40 TABLET, FILM COATED ORAL at 17:25

## 2024-11-12 RX ADMIN — ATROPINE SULFATE 0.5 MG: 0.1 INJECTION, SOLUTION ENDOTRACHEAL; INTRAMUSCULAR; INTRAVENOUS; SUBCUTANEOUS at 13:24

## 2024-11-12 RX ADMIN — ATROPINE SULFATE 0.5 MG: 0.1 INJECTION, SOLUTION ENDOTRACHEAL; INTRAMUSCULAR; INTRAVENOUS; SUBCUTANEOUS at 13:07

## 2024-11-12 RX ADMIN — MIDAZOLAM HYDROCHLORIDE 1 MG: 1 INJECTION, SOLUTION INTRAMUSCULAR; INTRAVENOUS at 12:58

## 2024-11-12 RX ADMIN — FENTANYL CITRATE 25 MCG: 50 INJECTION, SOLUTION INTRAMUSCULAR; INTRAVENOUS at 12:50

## 2024-11-12 RX ADMIN — FENTANYL CITRATE 25 MCG: 50 INJECTION, SOLUTION INTRAMUSCULAR; INTRAVENOUS at 12:58

## 2024-11-12 RX ADMIN — SENNOSIDES AND DOCUSATE SODIUM 2 TABLET: 50; 8.6 TABLET ORAL at 17:25

## 2024-11-12 RX ADMIN — IOHEXOL 62 ML: 300 INJECTION, SOLUTION INTRAVENOUS at 14:00

## 2024-11-12 RX ADMIN — NICOTINE TRANSDERMAL SYSTEM 21 MG: 21 PATCH, EXTENDED RELEASE TRANSDERMAL at 05:31

## 2024-11-12 RX ADMIN — OXYCODONE 2.5 MG: 5 TABLET ORAL at 21:23

## 2024-11-12 RX ADMIN — NOREPINEPHRINE BITARTRATE 0.03 MCG/KG/MIN: 32 SOLUTION INTRAVENOUS at 18:02

## 2024-11-12 RX ADMIN — ASPIRIN 81 MG: 81 TABLET, CHEWABLE ORAL at 05:32

## 2024-11-12 ASSESSMENT — ENCOUNTER SYMPTOMS
VOMITING: 0
WEAKNESS: 1
ABDOMINAL PAIN: 0
HEADACHES: 0
TINGLING: 0
FEVER: 0
NAUSEA: 0
SHORTNESS OF BREATH: 0

## 2024-11-12 ASSESSMENT — FIBROSIS 4 INDEX
FIB4 SCORE: 2.4
FIB4 SCORE: 2.79

## 2024-11-12 ASSESSMENT — PAIN DESCRIPTION - PAIN TYPE
TYPE: ACUTE PAIN

## 2024-11-12 NOTE — DIETARY
"Nutrition Services: Initial Assessment  Day 3 of admit.  Christopher Brown is a 64 y.o. male with admitting DX of Acute stroke due to ischemia (HCC) [I63.9]      Consult received for MST 3 with reported 14-23 lbs weight loss in 3 months and poor PO intake.      Nutrition Assessment:   Height: 188 cm (6' 2\")  Weight: 99.3 kg (218 lb 14.7 oz)  - bed scale  Body mass index is 28.11 kg/m²., BMI classification: overweight  Wt Readings from Last 5 Encounters:   11/12/24 99.3 kg (218 lb 14.7 oz)   11/09/24 105 kg (231 lb 7.7 oz)       Objective:   Pertinent medical hx: HTN. Admit with weakness to RUE.   Per admit screen, pt with 14-23 lbs weight loss in 3 months and poor PO intake.   Pertinent labs: glucose 112, Correct Ca 11, Alk phos 103, HDL 37  Pertinent meds: currently held  Skin/wounds: No wounds or edema noted.   Food Allergies: NKA to food.   Last BM: 11/11     Current diet order/intake:  NPO    Subjective:   Unfortunately, pt is off floor, unable to see at bedside. Pt had LICA stent placed today.   Nutrition Focused Physical Exam (NFPE)   Unknown      Nutrition Diagnosis: (PES)      Unable to determine at this time.     Nutrition Interventions:   Restart cardiac diet when appropriate.   Fluids per MD.   Follow up for interview/NFPE as time allows.  Patient aware of active plan of care as appropriate.     Nutrition Monitoring and Evaluation:   Monitor nutrition POC  Monitor vital signs pertinent to nutrition       RD following.      "

## 2024-11-12 NOTE — PROGRESS NOTES
Pt presents to IR. 2 was consented by MD at bedside, confirmed by this RN and consent at bedside. Pt transferred to IR  table in SUPINE position. Patient underwent a LEFT CAROTID STENT PLACMENT by Dr. COILNDRES. Procedure site was marked by MD and verified using imaging guidance. Pt placed on monitor, prepped and draped in a sterile fashion. Vitals were taken every 5 minutes and remained stable during procedure (see doc flow sheet for results). CO2 waveform capnography was monitored and remained WNL throughout procedure. Report called to [facility]. Pt transported by stretcher with RN to [facility] .       PERCLOSE RIGHT FEMORAL ARTERY  REF: [id number]  LOT: [id number]  EXP: 8/31/26   TIME 1335    [employer]   CAROTID WALL STENT [id number]  REF [id number]  LOT [id number]  EXP 02/15/2028

## 2024-11-12 NOTE — PROGRESS NOTES
Neurology Progress Note  Neurohospitalist Service, [facility]    Referring Physician: Caitlin Cobos M.D.      HPI: Refer to initial documented Neurology H&P, as detailed in the patient's chart.    Interval History: No acute events overnight.  No new complaints, no significant changes.  Right hand weakness has slightly improved.  Patient is anxious about his procedure tomorrow.    Review of systems: In addition to what is detailed in the HPI and/or updated in the interval history, all other systems reviewed and are negative.    Past Medical History:    has no past medical history on file.    FHx:  family history is not on file.    SHx:   reports that he has been smoking cigarettes. He started smoking about 59 years ago. He has a 89.8 pack-year smoking history. He has never used smokeless tobacco.    Medications:    Current Facility-Administered Medications:     PHENYLEPHRINE HCL-NACL 1-0.9 MG/10ML-% IV SOSY, , , ,     ATROPINE SULFATE 0.1 MG/ML INJ SOLN, , , ,     ATROPINE SULFATE 0.1 MG/ML INJ SOLN, , , ,     [MAR Hold] amLODIPine (Norvasc) tablet 5 mg, 5 mg, Oral, DAILY, Caitlin Cobos M.D., 5 mg at 11/12/24 0532    [COMPLETED] clopidogrel (Plavix) tablet 150 mg, 150 mg, Oral, DAILY, 150 mg at 11/11/24 0436 **FOLLOWED BY** [MAR Hold] clopidogrel (Plavix) tablet 75 mg, 75 mg, Oral, DAILY, Leonard Agrawal M.D., 75 mg at 11/12/24 0532    [MAR Hold] acetaminophen (Tylenol) tablet 650 mg, 650 mg, Oral, Q6HRS PRN, Angela Mares M.D.    Notify provider if pain remains uncontrolled, , , CONTINUOUS **AND** Use the Numeric Rating Scale (NRS), Chaidez-Baker Faces (WBF), or FLACC on regular floors and Critical-Care Pain Observation Tool (CPOT) on ICUs/Trauma to assess pain, , , CONTINUOUS **AND** Pulse Ox, , , CONTINUOUS **AND** [MAR Hold] Pharmacy Consult Request ...Pain Management Review 1 Each, 1 Each, Other, PHARMACY TO DOSE **AND** If patient difficult to arouse and/or has respiratory  depression (respiratory rate of 10 or less), stop any opiates that are currently infusing and call a Rapid Response., , , CONTINUOUS, Angela Mares M.D.    [MAR Hold] oxyCODONE immediate-release (Roxicodone) tablet 2.5 mg, 2.5 mg, Oral, Q3HRS PRN, 2.5 mg at 11/10/24 0933 **OR** [MAR Hold] oxyCODONE immediate-release (Roxicodone) tablet 5 mg, 5 mg, Oral, Q3HRS PRN, 5 mg at 11/11/24 1612 **OR** [MAR Hold] morphine 4 MG/ML injection 2 mg, 2 mg, Intravenous, Q3HRS PRN, Angela Mares M.D., 2 mg at 11/11/24 1756    [MAR Hold] senna-docusate (Pericolace Or Senokot S) 8.6-50 MG per tablet 2 Tablet, 2 Tablet, Oral, Q EVENING, 2 Tablet at 11/11/24 1611 **AND** [MAR Hold] polyethylene glycol/lytes (Miralax) Packet 1 Packet, 1 Packet, Oral, QDAY PRN, Angela Mares M.D.    [MAR Hold] ondansetron (Zofran) syringe/vial injection 4 mg, 4 mg, Intravenous, Q4HRS PRN, Angela Mares M.D.    [MAR Hold] ondansetron (Zofran ODT) dispertab 4 mg, 4 mg, Oral, Q4HRS PRN, Angela Mares M.D.    [MAR Hold] atorvastatin (Lipitor) tablet 40 mg, 40 mg, Oral, Q EVENING, Angela Mares M.D., 40 mg at 11/11/24 1612    [MAR Hold] aspirin (Asa) chewable tab 81 mg, 81 mg, Oral, DAILY, 81 mg at 11/12/24 0532 **OR** [MAR Hold] aspirin (Asa) suppository 300 mg, 300 mg, Rectal, DAILY, Angela Mares M.D.    [MAR Hold] hydrALAZINE (Apresoline) injection 10 mg, 10 mg, Intravenous, Q4HRS PRN, MARLO PichardoNMilyP., 10 mg at 11/10/24 0932    [MAR Hold] nicotine (Nicoderm) 21 MG/24HR 21 mg, 21 mg, Transdermal, Daily-0600, 21 mg at 11/12/24 0531 **AND** Nicotine Replacement Patient Education Materials, , , Once **AND** [MAR Hold] nicotine polacrilex (Nicorette) 2 MG piece 2 mg, 2 mg, Oral, Q HOUR PRN, MARLO PichardoNMilyPMily    Physical Examination:    Vitals:    11/12/24 1415 11/12/24 1430 11/12/24 1445 11/12/24 1500   BP: 97/57 101/63 105/69 101/65   Pulse: (!) 50 (!) 53 (!) 52 (!) 53   Resp: 15 20 16 20   Temp:       TempSrc:       SpO2: 93% 95% 97% 96%   Weight:      "  Height:           NEUROLOGICAL EXAM:   Mental status: Awake, alert and fully oriented, follows commands  Speech and language: speech is fluent and not dysarthric. The patient is able to name and repeat.  Cranial nerve exam: Pupils are equal, round and reactive to light bilaterally. Visual fields are full. Extraocular muscles are intact. Sensation in the face is intact to light touch. Face is symmetric. Hearing to finger rub equal. Palate elevates symmetrically. Shoulder shrug is full. Tongue is midline.  Motor exam: Sustain antigravity in all 4 extremities with no downward drift, although he has right hand  weakness which has improved since admission. Tone is normal. No abnormal movements were seen on exam.  Sensory exam: No sensory deficits identified   Coordination: no gross ataxia noted on exam  Plantar reflexes: Equivocal  Gait: deferred    Objective Data:    Labs:  No results found for: \"PROTHROMBTM\", \"INR\"   Lab Results   Component Value Date/Time    WBC 8.9 11/12/2024 03:32 AM    RBC 4.92 11/12/2024 03:32 AM    HEMOGLOBIN 16.8 11/12/2024 03:32 AM    HEMATOCRIT 47.8 11/12/2024 03:32 AM    MCV 97.2 11/12/2024 03:32 AM    MCH 34.1 (H) 11/12/2024 03:32 AM    MCHC 35.1 11/12/2024 03:32 AM    MPV 11.7 11/12/2024 03:32 AM      Lab Results   Component Value Date/Time    SODIUM 137 11/12/2024 03:32 AM    POTASSIUM 4.1 11/12/2024 03:32 AM    CHLORIDE 103 11/12/2024 03:32 AM    CO2 24 11/12/2024 03:32 AM    GLUCOSE 112 (H) 11/12/2024 03:32 AM    BUN 15 11/12/2024 03:32 AM    CREATININE 1.04 11/12/2024 03:32 AM      Lab Results   Component Value Date/Time    CHOLSTRLTOT 161 11/10/2024 06:40 AM    LDL 96 11/10/2024 06:40 AM    HDL 37 (A) 11/10/2024 06:40 AM    TRIGLYCERIDE 138 11/10/2024 06:40 AM       Lab Results   Component Value Date/Time    ALKPHOSPHAT 103 (H) 11/11/2024 07:54 AM    ASTSGOT 23 11/11/2024 07:54 AM    ALTSGPT 19 11/11/2024 07:54 AM    TBILIRUBIN 0.9 11/11/2024 07:54 AM        Imaging/Testing:    I " interpreted and/or reviewed the patient's neuroimaging    EC-ECHOCARDIOGRAM COMPLETE W/O CONT   Final Result      MR-BRAIN-W/O   Final Result      1.  Acute to subacute left posterior frontal and parietal infarcts. No definite evidence of hemorrhagic transformation.   2.  Mild diffuse cerebral and cerebellar substance loss.   3.  Mild microangiopathic ischemic change versus demyelination or gliosis.   4.  Chronic ischemic pontine gliosis.      IR-CERV CAROTID STENT WITH PROTECTION    (Results Pending)       Assessment and Plan:  Christopher Brown is a 64 y.o. right-handed male with past medical history significant for hypertension who was transferred from [facility], [place] for evaluation of right upper extremity weakness associated with left carotid stenosis.  Onset of symptom 4-5 days ago.  He underwent brain CT which revealed hypodensities in left parietal-occipital head region and subsequently MRI confirmed acute to subacute left posterior frontal and parietal infarct with no evidence of hemorrhage.  CT of the head and neck revealed high-grade left carotid stenosis.  Discussed with Dr. Canada, neuro-IR with plan for left carotid stenting on 11/12/2024.     Plan:  -q4h and PRN neuro assessment. VS per nursing/unit protocol.   -SBP goal is 120-180 prior to intervention.  -To IR today for left carotid stenting.  -Telemetry; currently SR. Screen for Afib/arrhythmia. Obtain TTE.  -ASA 81 mg PO q day  -Plavix 150 mg daily for 2 days prior to intervention, followed by aspirin 81 and Plavix 75 mg daily for 3 months or per IR recommendation after stent placement.  -Atorvastatin 80 mg PO q HS.  LDL is 96.  Goal LDL less than 70  -Recommend aggressive BG management per primary team. Avoid IVF with Dextrose. -BG goal .  Hemoglobin A1c is 5.4.  Goal < 7  -PT/OT/SLP eval and treat for early mobilization.  -Counseled patient at length regarding life style and risk factor modification for secondary stroke  prevention.   -All other medical management per primary team.   -DVT PPX: Okay with Lovenox      Please note that this dictation was created using voice recognition software. I have made every reasonable attempt to correct obvious errors, but I expect that there are errors of grammar and possibly content that I did not discover before finalizing the note.      Leonard Agrawal MD  Acute Care Neurology Services

## 2024-11-12 NOTE — OR SURGEON
Immediate Post- Operative Note        Findings: Severe ~ 90% LICA stenosis. LICA stent placed with good results.      Procedure(s): LICA stent placement      Estimated Blood Loss: Less than 5 ml        Complications: None            11/12/2024     1:40 PM     Katharina Canada M.D.

## 2024-11-12 NOTE — PROGRESS NOTES
Hospital Medicine Daily Progress Note    Date of Service  11/12/2024    Chief Complaint  Christopher Brown is a 64 y.o. male admitted 11/9/2024 with right upper extremity weakness.    Hospital Course  Christopher Brown is a 64-year-old male with PMHx HTN, current tobacco abuse.  Admitted 11/9 for right upper extremity weakness.    Per history-patient presented to an outside facility for right upper extremity weakness that started on Wednesday 11/6.  Patient did not seek medical attention until 11/9.      CTA head showing low-density in the left parieto-occipital region suggesting an area of previous infarction.  CTA head and neck: High-grade stenosis of left distal carotid artery with moderate stenosis of the internal carotid artery.    MRI brain: Acute and subacute left posterior frontal and parietal infarcts.  No definitive evidence of hemorrhagic transformation.    Neurology was consulted upon arrival.     Interval Problem Update  11/10: Vitals notable for SBP ranging 160-190.  Patient has required 1 dose IV antihypertensives overnight.  Start amlodipine 5 mg daily. Discussed with Dr. Agrawal, neurovascular. Planning for left ICA stenting Tuesday 11/12.    11/11: Vitals notable for SBP ranging 99 through 158.  N.p.o. at midnight for left ICA stenting tomorrow. Echo pending for CVA work up.     11/12: Vitals stable overnight.   through 160.  Creatinine stable at 1.04.  N.p.o. for left ICA stent today.    I have discussed this patient's plan of care and discharge plan at IDT rounds today with Case Management, Nursing, Nursing leadership, and other members of the IDT team.    Consultants/Specialty  neurology    Code Status  Full Code    Disposition  The patient is not medically cleared for discharge to home or a post-acute facility.      I have placed the appropriate orders for post-discharge needs.    Review of Systems  Review of Systems   Constitutional:  Negative for fever and malaise/fatigue.   Respiratory:   Negative for shortness of breath.    Cardiovascular:  Negative for chest pain and leg swelling.   Gastrointestinal:  Negative for abdominal pain, nausea and vomiting.        Physical Exam  Temp:  [36 °C (96.8 °F)-36.7 °C (98.1 °F)] 36.6 °C (97.9 °F)  Pulse:  [46-77] 73  Resp:  [12-18] 12  BP: ()/() 97/57  SpO2:  [92 %-98 %] 94 %    Physical Exam  Vitals and nursing note reviewed.   Constitutional:       General: He is not in acute distress.     Appearance: Normal appearance. He is not ill-appearing.   Cardiovascular:      Rate and Rhythm: Normal rate and regular rhythm.      Heart sounds: Murmur heard.   Pulmonary:      Effort: Pulmonary effort is normal.      Breath sounds: Normal breath sounds.   Skin:     General: Skin is warm and dry.   Neurological:      Mental Status: He is alert and oriented to person, place, and time. Mental status is at baseline.      Comments: RUE 3/5 flexion strength; 2/5  strength   Psychiatric:         Mood and Affect: Mood normal.         Behavior: Behavior normal.         Fluids    Intake/Output Summary (Last 24 hours) at 11/12/2024 1314  Last data filed at 11/11/2024 2000  Gross per 24 hour   Intake 240 ml   Output --   Net 240 ml        Laboratory  Recent Labs     11/10/24  0640 11/11/24 0754 11/12/24  0332   WBC 6.6 10.0 8.9   RBC 4.95 5.23 4.92   HEMOGLOBIN 16.4 17.7 16.8   HEMATOCRIT 48.6 51.0 47.8   MCV 98.2* 97.5 97.2   MCH 33.1* 33.8* 34.1*   MCHC 33.7 34.7 35.1   RDW 55.8* 54.8* 53.2*   PLATELETCT 123* 143* 123*   MPV 11.8 11.6 11.7     Recent Labs     11/10/24  0640 11/11/24 0754 11/12/24  0332   SODIUM 137 136 137   POTASSIUM 4.5 3.8 4.1   CHLORIDE 104 103 103   CO2 24 21 24   GLUCOSE 107* 101* 112*   BUN 7* 13 15   CREATININE 1.00 1.09 1.04   CALCIUM 10.5 11.2* 10.9*             Recent Labs     11/10/24  0640   TRIGLYCERIDE 138   HDL 37*   LDL 96       Imaging  EC-ECHOCARDIOGRAM COMPLETE W/O CONT   Final Result      MR-BRAIN-W/O   Final Result      1.   Acute to subacute left posterior frontal and parietal infarcts. No definite evidence of hemorrhagic transformation.   2.  Mild diffuse cerebral and cerebellar substance loss.   3.  Mild microangiopathic ischemic change versus demyelination or gliosis.   4.  Chronic ischemic pontine gliosis.      IR-CERV CAROTID STENT WITH PROTECTION    (Results Pending)        Assessment/Plan  * Acute stroke due to ischemia (HCC)- (present on admission)  Assessment & Plan  Onset on 11/6 Wednesday, presenting to hospital 11/9  CT head notes low density in the L parieto-occipital region suggests an area of previous infarction.   CTA head and neck noted high grade stenosis of the L distal common carotid artery with moderate stenosis of the internal carotid artery.   - Continue DAPT with clopdiogrel 150mg until 11/11 and then regular DAPT for 3 months (end date 2/11/2025)  - Continue Atorvastatin  - SBP goal   - Neurology following  - PT OT pending    -Echo pending    Stenosis of left internal carotid artery  Assessment & Plan  Neurovascular surgery is following  Discussed with Dr. Agrawal  - Plan for stenting 11/12  - N.p.o. for stent placement today  - Will transfer to ICU postoperatively    Polycythemia  Assessment & Plan  Hb 16.4  - Continue to monitor     Smoking  Assessment & Plan  5 minutes on tobacco cessation counseling provided including nicotine patches, gum, and dangers of smoking. Discussed the risks of smoking including increased risk of heart disease, stroke, cancer and COPD. Discussed the benefits of quitting smoking.       Primary hypertension  Assessment & Plan   through 160 overnight  - Continue amlodpine 5mg daily; started on 11/10  - Close monitoring of blood pressures  - IV antihypertensives for SBP >180  - Low threshold to add additional PO antihypertensives          VTE prophylaxis:   SCDs/TEDs      I have performed a physical exam and reviewed and updated ROS and Plan today (11/12/2024). In review  of yesterday's note (11/11/2024), there are no changes except as documented above.

## 2024-11-12 NOTE — PROGRESS NOTES
Monitor Summary: SB/SR 48-70, MA 0.17, QRS 0.10, QT 0.41, with rare couplets and rare PVCs per strip from monitor room.

## 2024-11-12 NOTE — CARE PLAN
The patient is Stable - Low risk of patient condition declining or worsening    Shift Goals  Clinical Goals: Stable Neuro Status, Pain Management  Patient Goals: Water Pitcher, Pain Control  Family Goals: SUE    Progress made toward(s) clinical / shift goals:    Problem: Neuro Status  Goal: Neuro status will remain stable or improve  Outcome: Progressing  Q4 hour neuro checks in place. Patient's neurological status (A/O x4) remains stable and unchanged throughout shift.     Problem: Pain - Standard  Goal: Alleviation of pain or a reduction in pain to the patient’s comfort goal  Outcome: Progressing  Frequent pain reassessments performed throughout shift using the 0-10 numerical pain scale. PRN pharmacological and non-pharmacological interventions available per MAR and patient request. Patient states relief in pain and is able to rest comfortably.      Problem: Risk for Aspiration  Goal: Patient's risk for aspiration will be absent or decrease  Outcome: Progressing  No signs/symptoms of aspiration. Patient denies having difficulty chewing or swallowing food and liquids. Patient tolerating Cardiac diet. Patient consumed 100% of dinner meal tray. Patient denies nausea or vomiting.      Problem: Mobility - Stroke  Goal: Patient's capacity to carry out activities will improve  Outcome: Progressing   Patient is able to move all extremities. No muscle asymmetry. Patient calls appropriately when needing to ambulate to the restroom. Patient requires minimal to no assistance from staff. Treaded socks in use.     Patient is not progressing towards the following goals:

## 2024-11-12 NOTE — OR NURSING
1350 Arrive to pacu w/ Daisha CHILDRESS  Pt AXOX4. SHI strong/=.. right groin CDI/soft.  2+ pedals pulses.

## 2024-11-12 NOTE — DISCHARGE PLANNING
0915- RN CM received a communication from bedside nurse that Pt's sister is asking for Advance Directives. RN CM met with Pt and his sister Nery at bedside and assist Pt and his sister with their questions regarding filling up Advance Directives form. Pt had the advance directive given to him by weekend CM last Sunday but unable to filled it out. RN CM [place] and his [place].     Pt has a scheduled left carotid stenting today.

## 2024-11-12 NOTE — DISCHARGE PLANNING
IR for left carotid stenting planned for today.  Following for updated TX evals s/p surgical intervention.

## 2024-11-13 DIAGNOSIS — I65.22 INTERNAL CAROTID ARTERY STENOSIS, LEFT: ICD-10-CM

## 2024-11-13 PROBLEM — R00.1 BRADYCARDIA: Status: ACTIVE | Noted: 2024-11-13

## 2024-11-13 LAB
ANION GAP SERPL CALC-SCNC: 14 MMOL/L (ref 7–16)
BUN SERPL-MCNC: 19 MG/DL (ref 8–22)
CALCIUM SERPL-MCNC: 8.7 MG/DL (ref 8.5–10.5)
CHLORIDE SERPL-SCNC: 108 MMOL/L (ref 96–112)
CO2 SERPL-SCNC: 17 MMOL/L (ref 20–33)
CREAT SERPL-MCNC: 0.97 MG/DL (ref 0.5–1.4)
EKG IMPRESSION: NORMAL
EKG IMPRESSION: NORMAL
ERYTHROCYTE [DISTWIDTH] IN BLOOD BY AUTOMATED COUNT: 54.6 FL (ref 35.9–50)
FIBRINOGEN PPP-MCNC: 273 MG/DL (ref 215–460)
GFR SERPLBLD CREATININE-BSD FMLA CKD-EPI: 87 ML/MIN/1.73 M 2
GLUCOSE BLD STRIP.AUTO-MCNC: 87 MG/DL (ref 65–99)
GLUCOSE BLD STRIP.AUTO-MCNC: 93 MG/DL (ref 65–99)
GLUCOSE BLD STRIP.AUTO-MCNC: 99 MG/DL (ref 65–99)
GLUCOSE SERPL-MCNC: 198 MG/DL (ref 65–99)
HCT VFR BLD AUTO: 47.4 % (ref 42–52)
HGB BLD-MCNC: 16.1 G/DL (ref 14–18)
INR PPP: 1.12 (ref 0.87–1.13)
MAGNESIUM SERPL-MCNC: 1.7 MG/DL (ref 1.5–2.5)
MCH RBC QN AUTO: 33.8 PG (ref 27–33)
MCHC RBC AUTO-ENTMCNC: 34 G/DL (ref 32.3–36.5)
MCV RBC AUTO: 99.4 FL (ref 81.4–97.8)
PLATELET # BLD AUTO: 157 K/UL (ref 164–446)
PMV BLD AUTO: 12.1 FL (ref 9–12.9)
POTASSIUM SERPL-SCNC: 2.7 MMOL/L (ref 3.6–5.5)
PROTHROMBIN TIME: 14.5 SEC (ref 12–14.6)
RBC # BLD AUTO: 4.77 M/UL (ref 4.7–6.1)
SODIUM SERPL-SCNC: 139 MMOL/L (ref 135–145)
TROPONIN T SERPL-MCNC: 16 NG/L (ref 6–19)
TROPONIN T SERPL-MCNC: 27 NG/L (ref 6–19)
WBC # BLD AUTO: 20.7 K/UL (ref 4.8–10.8)

## 2024-11-13 PROCEDURE — 80048 BASIC METABOLIC PNL TOTAL CA: CPT

## 2024-11-13 PROCEDURE — 93005 ELECTROCARDIOGRAM TRACING: CPT | Performed by: STUDENT IN AN ORGANIZED HEALTH CARE EDUCATION/TRAINING PROGRAM

## 2024-11-13 PROCEDURE — 85384 FIBRINOGEN ACTIVITY: CPT

## 2024-11-13 PROCEDURE — 99232 SBSQ HOSP IP/OBS MODERATE 35: CPT | Performed by: PSYCHIATRY & NEUROLOGY

## 2024-11-13 PROCEDURE — 93010 ELECTROCARDIOGRAM REPORT: CPT | Mod: 76 | Performed by: INTERNAL MEDICINE

## 2024-11-13 PROCEDURE — 82962 GLUCOSE BLOOD TEST: CPT | Mod: 91

## 2024-11-13 PROCEDURE — 700102 HCHG RX REV CODE 250 W/ 637 OVERRIDE(OP): Performed by: EMERGENCY MEDICINE

## 2024-11-13 PROCEDURE — 85610 PROTHROMBIN TIME: CPT

## 2024-11-13 PROCEDURE — 770022 HCHG ROOM/CARE - ICU (200)

## 2024-11-13 PROCEDURE — 85027 COMPLETE CBC AUTOMATED: CPT

## 2024-11-13 PROCEDURE — 97162 PT EVAL MOD COMPLEX 30 MIN: CPT

## 2024-11-13 PROCEDURE — A9270 NON-COVERED ITEM OR SERVICE: HCPCS | Performed by: PSYCHIATRY & NEUROLOGY

## 2024-11-13 PROCEDURE — A9270 NON-COVERED ITEM OR SERVICE: HCPCS | Performed by: EMERGENCY MEDICINE

## 2024-11-13 PROCEDURE — 700102 HCHG RX REV CODE 250 W/ 637 OVERRIDE(OP): Performed by: PSYCHIATRY & NEUROLOGY

## 2024-11-13 PROCEDURE — A9270 NON-COVERED ITEM OR SERVICE: HCPCS | Performed by: NURSE PRACTITIONER

## 2024-11-13 PROCEDURE — 97535 SELF CARE MNGMENT TRAINING: CPT

## 2024-11-13 PROCEDURE — 84484 ASSAY OF TROPONIN QUANT: CPT

## 2024-11-13 PROCEDURE — 93010 ELECTROCARDIOGRAM REPORT: CPT | Performed by: INTERNAL MEDICINE

## 2024-11-13 PROCEDURE — 700102 HCHG RX REV CODE 250 W/ 637 OVERRIDE(OP): Performed by: NURSE PRACTITIONER

## 2024-11-13 PROCEDURE — A9270 NON-COVERED ITEM OR SERVICE: HCPCS | Performed by: STUDENT IN AN ORGANIZED HEALTH CARE EDUCATION/TRAINING PROGRAM

## 2024-11-13 PROCEDURE — 700105 HCHG RX REV CODE 258: Performed by: NURSE PRACTITIONER

## 2024-11-13 PROCEDURE — 700102 HCHG RX REV CODE 250 W/ 637 OVERRIDE(OP): Performed by: STUDENT IN AN ORGANIZED HEALTH CARE EDUCATION/TRAINING PROGRAM

## 2024-11-13 PROCEDURE — 83735 ASSAY OF MAGNESIUM: CPT

## 2024-11-13 PROCEDURE — 700102 HCHG RX REV CODE 250 W/ 637 OVERRIDE(OP)

## 2024-11-13 PROCEDURE — A9270 NON-COVERED ITEM OR SERVICE: HCPCS

## 2024-11-13 PROCEDURE — 99222 1ST HOSP IP/OBS MODERATE 55: CPT | Performed by: INTERNAL MEDICINE

## 2024-11-13 PROCEDURE — 700111 HCHG RX REV CODE 636 W/ 250 OVERRIDE (IP): Performed by: NURSE PRACTITIONER

## 2024-11-13 PROCEDURE — 99291 CRITICAL CARE FIRST HOUR: CPT | Performed by: STUDENT IN AN ORGANIZED HEALTH CARE EDUCATION/TRAINING PROGRAM

## 2024-11-13 RX ORDER — TRAZODONE HYDROCHLORIDE 100 MG/1
50 TABLET ORAL
Status: DISCONTINUED | OUTPATIENT
Start: 2024-11-13 | End: 2024-11-15 | Stop reason: HOSPADM

## 2024-11-13 RX ORDER — INSULIN LISPRO 100 [IU]/ML
2-9 INJECTION, SOLUTION INTRAVENOUS; SUBCUTANEOUS
Status: DISCONTINUED | OUTPATIENT
Start: 2024-11-13 | End: 2024-11-15 | Stop reason: HOSPADM

## 2024-11-13 RX ORDER — MAGNESIUM SULFATE HEPTAHYDRATE 40 MG/ML
2 INJECTION, SOLUTION INTRAVENOUS ONCE
Status: COMPLETED | OUTPATIENT
Start: 2024-11-13 | End: 2024-11-13

## 2024-11-13 RX ORDER — DEXTROSE MONOHYDRATE 25 G/50ML
25 INJECTION, SOLUTION INTRAVENOUS
Status: DISCONTINUED | OUTPATIENT
Start: 2024-11-13 | End: 2024-11-15 | Stop reason: HOSPADM

## 2024-11-13 RX ADMIN — TRAZODONE HYDROCHLORIDE 50 MG: 100 TABLET ORAL at 20:48

## 2024-11-13 RX ADMIN — ASPIRIN 81 MG: 81 TABLET, CHEWABLE ORAL at 05:05

## 2024-11-13 RX ADMIN — THERA TABS 1 TABLET: TAB at 05:05

## 2024-11-13 RX ADMIN — Medication 5 MG: at 20:48

## 2024-11-13 RX ADMIN — MAGNESIUM SULFATE HEPTAHYDRATE 2 G: 2 INJECTION, SOLUTION INTRAVENOUS at 04:06

## 2024-11-13 RX ADMIN — EPINEPHRINE 0.17 MCG/KG/MIN: 1 INJECTION INTRAMUSCULAR; INTRAVENOUS; SUBCUTANEOUS at 05:06

## 2024-11-13 RX ADMIN — POTASSIUM BICARBONATE 50 MEQ: 978 TABLET, EFFERVESCENT ORAL at 04:04

## 2024-11-13 RX ADMIN — ATORVASTATIN CALCIUM 40 MG: 40 TABLET, FILM COATED ORAL at 17:14

## 2024-11-13 RX ADMIN — NICOTINE TRANSDERMAL SYSTEM 21 MG: 21 PATCH, EXTENDED RELEASE TRANSDERMAL at 05:05

## 2024-11-13 RX ADMIN — CLOPIDOGREL BISULFATE 75 MG: 75 TABLET ORAL at 05:05

## 2024-11-13 RX ADMIN — POTASSIUM BICARBONATE 50 MEQ: 978 TABLET, EFFERVESCENT ORAL at 08:09

## 2024-11-13 ASSESSMENT — ENCOUNTER SYMPTOMS
VOMITING: 0
DIAPHORESIS: 0
FOCAL WEAKNESS: 0
CHILLS: 0
PND: 0
PALPITATIONS: 0
NEAR-SYNCOPE: 0
HEMATOCHEZIA: 0
TINGLING: 0
HEMOPTYSIS: 0
WHEEZING: 0
FEVER: 0
DYSPNEA ON EXERTION: 0
ABDOMINAL PAIN: 0
NAUSEA: 0
COUGH: 0
SHORTNESS OF BREATH: 0
IRREGULAR HEARTBEAT: 0
SPEECH CHANGE: 0
SYNCOPE: 0
HEADACHES: 0
SENSORY CHANGE: 0
ORTHOPNEA: 0
WEAKNESS: 1

## 2024-11-13 ASSESSMENT — COGNITIVE AND FUNCTIONAL STATUS - GENERAL
MOBILITY SCORE: 24
SUGGESTED CMS G CODE MODIFIER MOBILITY: CH

## 2024-11-13 ASSESSMENT — GAIT ASSESSMENTS
DEVIATION: NO DEVIATION
GAIT LEVEL OF ASSIST: SUPERVISED
DISTANCE (FEET): 500

## 2024-11-13 NOTE — PROGRESS NOTES
Neurology Progress Note  Neurohospitalist Service, [facility]    Referring Physician: Caitlin Cobos M.D.      HPI: Refer to initial documented Neurology H&P, as detailed in the patient's chart.    Interval History:  No acute events overnight, no new complaints.  He feels okay and right hand weakness has improved significantly.  Underwent successful stent placement of left ICA.      Review of systems: In addition to what is detailed in the HPI and/or updated in the interval history, all other systems reviewed and are negative.    Past Medical History:    has no past medical history on file.    FHx:  family history is not on file.    SHx:   reports that he has been smoking cigarettes. He started smoking about 59 years ago. He has a 89.8 pack-year smoking history. He has never used smokeless tobacco.    Medications:    Current Facility-Administered Medications:     insulin lispro (HumaLOG,AdmeLOG) subcutaneous injection, 2-9 Units, Subcutaneous, 4X/DAY ACHS **AND** POC blood glucose manual result, , , Q AC AND BEDTIME(S) **AND** NOTIFY MD and PharmD, , , Once **AND** Administer 20 grams of glucose (approximately 8 ounces of fruit juice) every 15 minutes PRN FSBG less than 70 mg/dL, , , PRN **AND** dextrose 50% (D50W) injection 25 g, 25 g, Intravenous, Q15 MIN PRN, Malcolm Huang D.KRIS    niCARdipine (Cardene) 20 mg/200 mL NS Standard Infusion, 0-15 mg/hr, Intravenous, Continuous, Katharina Canada M.D., Held at 11/12/24 1712    multivitamin tablet 1 Tablet, 1 Tablet, Oral, DAILY, Román Doyle M.D., 1 Tablet at 11/13/24 0505    norepinephrine (Levophed) 8 mg in 250 mL NS infusion (premix), 0.01-0.3 mcg/kg/min (Ideal), Intravenous, Continuous, Román Doyle M.D., Stopped at 11/13/24 0104    atropine injection 0.5 mg, 0.5 mg, Intravenous, Once PRN, Marily Palacios    EPINEPHrine (Adrenalin) infusion 4 mg/250 mL (premix), 0-0.5 mcg/kg/min (Ideal), Intravenous, Continuous, Marily Palacios,  Stopped at 11/13/24 1235    [COMPLETED] clopidogrel (Plavix) tablet 150 mg, 150 mg, Oral, DAILY, 150 mg at 11/11/24 0436 **FOLLOWED BY** clopidogrel (Plavix) tablet 75 mg, 75 mg, Oral, DAILY, Leonard Agrawal M.D., 75 mg at 11/13/24 0505    acetaminophen (Tylenol) tablet 650 mg, 650 mg, Oral, Q6HRS PRN, Angela Mares M.D., 650 mg at 11/12/24 2123    Notify provider if pain remains uncontrolled, , , CONTINUOUS **AND** Use the Numeric Rating Scale (NRS), Chaidez-Baker Faces (WBF), or FLACC on regular floors and Critical-Care Pain Observation Tool (CPOT) on ICUs/Trauma to assess pain, , , CONTINUOUS **AND** Pulse Ox, , , CONTINUOUS **AND** Pharmacy Consult Request ...Pain Management Review 1 Each, 1 Each, Other, PHARMACY TO DOSE **AND** If patient difficult to arouse and/or has respiratory depression (respiratory rate of 10 or less), stop any opiates that are currently infusing and call a Rapid Response., , , CONTINUOUS, Angela Mares M.D.    oxyCODONE immediate-release (Roxicodone) tablet 2.5 mg, 2.5 mg, Oral, Q3HRS PRN, 2.5 mg at 11/12/24 2123 **OR** oxyCODONE immediate-release (Roxicodone) tablet 5 mg, 5 mg, Oral, Q3HRS PRN, 5 mg at 11/11/24 1612 **OR** morphine 4 MG/ML injection 2 mg, 2 mg, Intravenous, Q3HRS PRN, Angela Mares M.D., 2 mg at 11/11/24 1756    senna-docusate (Pericolace Or Senokot S) 8.6-50 MG per tablet 2 Tablet, 2 Tablet, Oral, Q EVENING, 2 Tablet at 11/12/24 1725 **AND** polyethylene glycol/lytes (Miralax) Packet 1 Packet, 1 Packet, Oral, QDAY PRN, Angela Mares M.D.    ondansetron (Zofran) syringe/vial injection 4 mg, 4 mg, Intravenous, Q4HRS PRN, Angela Mares M.D.    ondansetron (Zofran ODT) dispertab 4 mg, 4 mg, Oral, Q4HRS PRN, Angela Mares M.D.    atorvastatin (Lipitor) tablet 40 mg, 40 mg, Oral, Q EVENING, Angela Mares M.D., 40 mg at 11/12/24 1725    aspirin (Asa) chewable tab 81 mg, 81 mg, Oral, DAILY, 81 mg at 11/13/24 0505 **OR** aspirin (Asa) suppository 300 mg, 300 mg, Rectal, DAILY,  Angela Mares M.D.    hydrALAZINE (Apresoline) injection 10 mg, 10 mg, Intravenous, Q4HRS PRN, MARLO PichardoN.P., 10 mg at 11/10/24 0932    nicotine (Nicoderm) 21 MG/24HR 21 mg, 21 mg, Transdermal, Daily-0600, 21 mg at 11/13/24 0505 **AND** Nicotine Replacement Patient Education Materials, , , Once **AND** nicotine polacrilex (Nicorette) 2 MG piece 2 mg, 2 mg, Oral, Q HOUR PRN, MARLO PichardoNMilyP.    Physical Examination:    Vitals:    11/13/24 1145 11/13/24 1200 11/13/24 1215 11/13/24 1230   BP: 107/51 111/56 113/59 132/61   Pulse: (!) 51 (!) 50 (!) 48 (!) 55   Resp: 20 16 20 18   Temp:       TempSrc:       SpO2: 94% 98% 96% 97%   Weight:       Height:           NEUROLOGICAL EXAM:   Mental status: Awake, alert and fully oriented, follows commands  Speech and language: speech is fluent and not dysarthric. The patient is able to name and repeat.  Cranial nerve exam: Pupils are equal, round and reactive to light bilaterally. Visual fields are full. Extraocular muscles are intact. Sensation in the face is intact to light touch. Face is symmetric. Hearing to finger rub equal. Palate elevates symmetrically. Shoulder shrug is full. Tongue is midline.  Motor exam: Sustain antigravity in all 4 extremities with no downward drift, although he has right hand  weakness which has improved since admission. Tone is normal. No abnormal movements were seen on exam.  Sensory exam: No sensory deficits identified   Coordination: no gross ataxia noted on exam  Plantar reflexes: Equivocal  Gait: deferred    Objective Data:    Labs:  Lab Results   Component Value Date/Time    PROTHROMBTM 14.5 11/13/2024 02:45 AM    INR 1.12 11/13/2024 02:45 AM      Lab Results   Component Value Date/Time    WBC 20.7 (H) 11/13/2024 01:14 AM    RBC 4.77 11/13/2024 01:14 AM    HEMOGLOBIN 16.1 11/13/2024 01:14 AM    HEMATOCRIT 47.4 11/13/2024 01:14 AM    MCV 99.4 (H) 11/13/2024 01:14 AM    MCH 33.8 (H) 11/13/2024 01:14 AM    MCHC 34.0 11/13/2024 01:14  AM    MPV 12.1 11/13/2024 01:14 AM      Lab Results   Component Value Date/Time    SODIUM 139 11/13/2024 02:45 AM    POTASSIUM 2.7 (LL) 11/13/2024 02:45 AM    CHLORIDE 108 11/13/2024 02:45 AM    CO2 17 (L) 11/13/2024 02:45 AM    GLUCOSE 198 (H) 11/13/2024 02:45 AM    BUN 19 11/13/2024 02:45 AM    CREATININE 0.97 11/13/2024 02:45 AM      Lab Results   Component Value Date/Time    CHOLSTRLTOT 161 11/10/2024 06:40 AM    LDL 96 11/10/2024 06:40 AM    HDL 37 (A) 11/10/2024 06:40 AM    TRIGLYCERIDE 138 11/10/2024 06:40 AM       Lab Results   Component Value Date/Time    ALKPHOSPHAT 103 (H) 11/11/2024 07:54 AM    ASTSGOT 23 11/11/2024 07:54 AM    ALTSGPT 19 11/11/2024 07:54 AM    TBILIRUBIN 0.9 11/11/2024 07:54 AM        Imaging/Testing:    I interpreted and/or reviewed the patient's neuroimaging    EC-ECHOCARDIOGRAM COMPLETE W/O CONT   Final Result      MR-BRAIN-W/O   Final Result      1.  Acute to subacute left posterior frontal and parietal infarcts. No definite evidence of hemorrhagic transformation.   2.  Mild diffuse cerebral and cerebellar substance loss.   3.  Mild microangiopathic ischemic change versus demyelination or gliosis.   4.  Chronic ischemic pontine gliosis.      IR-CERV CAROTID STENT WITH PROTECTION    (Results Pending)       Assessment and Plan:  Christopher Brown is a 64 y.o. right-handed male with past medical history significant for hypertension who was transferred from [facility] for evaluation of right upper extremity weakness associated with left carotid stenosis.  Onset of symptom 4-5 days ago.  He underwent brain CT which revealed hypodensities in left parietal-occipital head region and subsequently MRI confirmed acute to subacute left posterior frontal and parietal infarct with no evidence of hemorrhage.  CT of the head and neck revealed high-grade left carotid stenosis.  Underwent successful stenting of left carotid.  Echocardiogram is unremarkable with ejection fraction of 55%,  normal left atrial size and no evidence of PFO.     Plan:  -q4h and PRN neuro assessment. VS per nursing/unit protocol.   -SBP goal is normal, 100-130/60-80  -To IR today for left carotid stenting.  -Telemetry; currently SR. Screen for Afib/arrhythmia. Obtain TTE.  -ASA 81 mg PO q day indefinitely and Plavix 75 mg daily for 3 months.  -Atorvastatin 80 mg PO q HS.  LDL is 96.  Goal LDL less than 70  -Recommend aggressive BG management per primary team. Avoid IVF with Dextrose. -BG goal .  Hemoglobin A1c is 5.4.  Goal < 7  -PT/OT/SLP eval and treat for early mobilization.  -All other medical management per primary team.   -DVT PPX: Okay with Lovenox  -Follow-up with vascular neurology clinic in 4 weeks.  -Neurology will sign off, please call me if there is any question.        Please note that this dictation was created using voice recognition software. I have made every reasonable attempt to correct obvious errors, but I expect that there are errors of grammar and possibly content that I did not discover before finalizing the note.      Leonard Agrawal MD  Acute Care Neurology Services

## 2024-11-13 NOTE — CONSULTS
Cardiology Initial Consult Note    Date of note:    11/13/2024      Consulting Physician: Malcolm Huang D.O.    Name:   Christopher Brown     YOB: 1960  Age:   64 y.o.  male   MRN:   [id number]    Assessment/Recommendations:  Sinus pauses with transient AV block (possible second degree Type I, not classic Wenkebach) all during sleep.  No pauses over 4 seconds.  Patient has never had symptoms of lightheadedness, dizziness, or syncope or dyspnea on exertion prior to this.  He might have sleep apnea.  However, carotid artery stenting can also contribute.  It has been reported in literature hypotension and bradycardia can occur during or after carotid artery stent placement (ANN) due to the stretching of the carotid sinus baroreceptors by the balloon and the stent.  No clear indication for permanent pacemaker.  Would monitor on tele for another 24 hours.  If pauses and blocks all during sleep, still no indication for pacing.  If during the day or patient symptomatic, then please call cardiology back.   Follow up with his primary care provider in [place].    Discussed with Dr. Huang.   Cardiology will sign off.  Call if any questions/concerns.      Reason for Consultation: sinus pauses and AV block    HPI:  Christopher Brown is a 64 y.o. male  hypertension (not on meds he states as he cannot afford them), smoker (1 ppd for many years), lives alone, presented with right upper extremity weakness.     Patient was transferred from [facility] on 11/9/2024 with right upper extremity weakness associated with left carotid stenosis.  MRI confirmed acute to subacute left posterior frontal and parietal infarct without evidence of hemorrhage with high-grade left carotid stenosis.  Patient underwent left internal carotid artery stenting on 11/12/2024.    Patient reports prior to his admission, he never had any lightheadedness, no dizziness, no syncope, no dyspnea on exertion, no chest pain.     Review of  all telemetry strips:  11/12 from 12 midnight to around 2:30 am, patient had multiple episodes of sinus frank, all less then 4 seconds, few blocked P wave, and at 00:44, he had either Wenkebach vs AV dissociation, but some P waves are conducted so not clear.  These all occurred during sleep, no symptoms.     Patient does snore.     Problem list:  Principal Problem:    Acute stroke due to ischemia (HCC) (POA: Yes)  Active Problems:    Primary hypertension (POA: Unknown)    Smoking (POA: Unknown)    Polycythemia (POA: Unknown)    Stenosis of left internal carotid artery (POA: Unknown)    Thrombocytopenia (HCC) (POA: Unknown)  Resolved Problems:    * No resolved hospital problems. *      No past medical history on file.    No past surgical history on file.      No medications prior to admission.     Current Facility-Administered Medications   Medication Dose Route Frequency Provider Last Rate Last Admin    insulin lispro (HumaLOG,AdmeLOG) subcutaneous injection  2-9 Units Subcutaneous 4X/DAY ACHS Malcolm [place], D.O.        And    dextrose 50% (D50W) injection 25 g  25 g Intravenous Q15 MIN PRN Malcolm Sal, D.O.        niCARdipine (Cardene) 20 mg/200 mL NS Standard Infusion  0-15 mg/hr Intravenous Continuous Katharina Canada M.D.   Held at 11/12/24 1712    multivitamin tablet 1 Tablet  1 Tablet Oral DAILY Román Doyle M.D.   1 Tablet at 11/13/24 0505    norepinephrine (Levophed) 8 mg in 250 mL NS infusion (premix)  0.01-0.3 mcg/kg/min (Ideal) Intravenous Continuous Román Doyle M.D.   Stopped at 11/13/24 0104    atropine injection 0.5 mg  0.5 mg Intravenous Once PRN Marily L. Latona        EPINEPHrine (Adrenalin) infusion 4 mg/250 mL (premix)  0-0.5 mcg/kg/min (Ideal) Intravenous Continuous Marily LMily Latona 24.7 mL/hr at 11/13/24 0931 0.08 mcg/kg/min at 11/13/24 0931    clopidogrel (Plavix) tablet 75 mg  75 mg Oral DAILY Leonard Agrawal M.D.   75 mg at 11/13/24 0505    acetaminophen (Tylenol) tablet 650 mg  650 mg  Oral Q6HRS PRN Angela Mares M.D.   650 mg at 11/12/24 2123    Pharmacy Consult Request ...Pain Management Review 1 Each  1 Each Other PHARMACY TO DOSE Angela Mares M.D.        oxyCODONE immediate-release (Roxicodone) tablet 2.5 mg  2.5 mg Oral Q3HRS PRN Angela Mares M.D.   2.5 mg at 11/12/24 2123    Or    oxyCODONE immediate-release (Roxicodone) tablet 5 mg  5 mg Oral Q3HRS PRN Angela Mares M.D.   5 mg at 11/11/24 1612    Or    morphine 4 MG/ML injection 2 mg  2 mg Intravenous Q3HRS PRN Angela Mares M.D.   2 mg at 11/11/24 1756    senna-docusate (Pericolace Or Senokot S) 8.6-50 MG per tablet 2 Tablet  2 Tablet Oral Q EVENING Angela Mares M.D.   2 Tablet at 11/12/24 1725    And    polyethylene glycol/lytes (Miralax) Packet 1 Packet  1 Packet Oral QDAY PRN Angela Mares M.D.        ondansetron (Zofran) syringe/vial injection 4 mg  4 mg Intravenous Q4HRS PRN Angela Mares M.D.        ondansetron (Zofran ODT) dispertab 4 mg  4 mg Oral Q4HRS PRN Angela Mares M.D.        atorvastatin (Lipitor) tablet 40 mg  40 mg Oral Q EVENING Angela Mares M.D.   40 mg at 11/12/24 1725    aspirin (Asa) chewable tab 81 mg  81 mg Oral DAILY Angela Mares M.D.   81 mg at 11/13/24 0505    Or    aspirin (Asa) suppository 300 mg  300 mg Rectal DAILY Angela Mares M.D.        hydrALAZINE (Apresoline) injection 10 mg  10 mg Intravenous Q4HRS PRN MARLO PichardoN.P.   10 mg at 11/10/24 0932    nicotine (Nicoderm) 21 MG/24HR 21 mg  21 mg Transdermal Daily-0600 MARLO PichardoN.P.   21 mg at 11/13/24 0505    And    nicotine polacrilex (Nicorette) 2 MG piece 2 mg  2 mg Oral Q HOUR PRN ARIELLE Pichardo.N.P.           No Known Allergies    No family history on file.    Social History     Socioeconomic History    Marital status:      Spouse name: Not on file    Number of children: Not on file    Years of education: Not on file    Highest education level: Not on file   Occupational History    Not on file   Tobacco Use    Smoking status:  Every Day     Current packs/day: 1.50     Average packs/day: 1.5 packs/day for 59.9 years (89.8 ttl pk-yrs)     Types: Cigarettes     Start date: 1965    Smokeless tobacco: Never   Vaping Use    Vaping status: Never Used   Substance and Sexual Activity    Alcohol use: Not on file    Drug use: Not on file    Sexual activity: Not on file   Other Topics Concern    Not on file   Social History Narrative    Not on file     Social Drivers of Health     Financial Resource Strain: Not on file   Food Insecurity: Food Insecurity Present (11/10/2024)    Hunger Vital Sign     Worried About Running Out of Food in the Last Year: Never true     Ran Out of Food in the Last Year: Sometimes true   Transportation Needs: No Transportation Needs (11/10/2024)    PRAPARE - Transportation     Lack of Transportation (Medical): No     Lack of Transportation (Non-Medical): No   Physical Activity: Not on file   Stress: Not on file   Social Connections: Not on file   Intimate Partner Violence: Not At Risk (11/10/2024)    Humiliation, Afraid, Rape, and Kick questionnaire     Fear of Current or Ex-Partner: No     Emotionally Abused: No     Physically Abused: No     Sexually Abused: No   Housing Stability: High Risk (11/10/2024)    Housing Stability Vital Sign     Unable to Pay for Housing in the Last Year: Yes     Number of Times Moved in the Last Year: 0     Homeless in the Last Year: No         Intake/Output Summary (Last 24 hours) at 11/13/2024 0954  Last data filed at 11/13/2024 0800  Gross per 24 hour   Intake 2280.28 ml   Output --   Net 2280.28 ml         Review of Systems   Constitutional: Negative for diaphoresis and fever.   Cardiovascular:  Negative for chest pain, dyspnea on exertion, irregular heartbeat, near-syncope, orthopnea, palpitations, paroxysmal nocturnal dyspnea and syncope.   Respiratory:  Negative for cough, hemoptysis and wheezing.    Gastrointestinal:  Negative for hematochezia and melena.   Genitourinary:  Negative for  "hematuria.        Physical Exam  Body mass index is 28.9 kg/m².  /53   Pulse (!) 55   Temp 36.7 °C (98 °F) (Temporal)   Resp 20   Ht 1.88 m (6' 2\")   Wt 102 kg (225 lb 1.4 oz)   SpO2 95%   Vitals:    11/13/24 0830 11/13/24 0845 11/13/24 0900 11/13/24 0915   BP: 116/58 122/60 117/54 114/53   Pulse: (!) 50 (!) 47 (!) 53 (!) 55   Resp: (!) 21 (!) 23 20 20   Temp:       TempSrc:       SpO2: 95% 92% 95%    Weight:       Height:         Oxygen Therapy:  Pulse Oximetry: 95 %, O2 (LPM): 0, O2 Delivery Device: None - Room Air    Physical Exam  Constitutional:       Appearance: Normal appearance.   Eyes:      Extraocular Movements: Extraocular movements intact.      Conjunctiva/sclera: Conjunctivae normal.   Neck:      Vascular: Carotid bruit present. No JVD.   Cardiovascular:      Rate and Rhythm: Normal rate and regular rhythm.      Pulses:           Radial pulses are 1+ on the right side and 1+ on the left side.        Posterior tibial pulses are 1+ on the right side and 1+ on the left side.      Heart sounds: Normal heart sounds. No murmur heard.     No friction rub. No gallop.   Pulmonary:      Effort: Pulmonary effort is normal. No respiratory distress.      Breath sounds: Normal breath sounds. No wheezing or rales.   Abdominal:      General: Bowel sounds are normal.      Palpations: Abdomen is soft.   Musculoskeletal:      Cervical back: Neck supple.      Right lower leg: No edema.      Left lower leg: No edema.   Skin:     General: Skin is warm and dry.   Neurological:      Mental Status: He is alert and oriented to person, place, and time. Mental status is at baseline.   Psychiatric:         Mood and Affect: Mood normal.          Labs (personally reviewed and notable for):   Lab Results   Component Value Date/Time    SODIUM 139 11/13/2024 02:45 AM    POTASSIUM 2.7 (LL) 11/13/2024 02:45 AM    CHLORIDE 108 11/13/2024 02:45 AM    CO2 17 (L) 11/13/2024 02:45 AM    GLUCOSE 198 (H) 11/13/2024 02:45 AM    BUN 19 " "11/13/2024 02:45 AM    CREATININE 0.97 11/13/2024 02:45 AM      Lab Results   Component Value Date/Time    WBC 20.7 (H) 11/13/2024 01:14 AM    RBC 4.77 11/13/2024 01:14 AM    HEMOGLOBIN 16.1 11/13/2024 01:14 AM    HEMATOCRIT 47.4 11/13/2024 01:14 AM    MCV 99.4 (H) 11/13/2024 01:14 AM    MCH 33.8 (H) 11/13/2024 01:14 AM    MCHC 34.0 11/13/2024 01:14 AM    MPV 12.1 11/13/2024 01:14 AM    INR 1.12 11/13/2024 02:45 AM      Lab Results   Component Value Date/Time    CHOLSTRLTOT 161 11/10/2024 06:40 AM    LDL 96 11/10/2024 06:40 AM    HDL 37 (A) 11/10/2024 06:40 AM    TRIGLYCERIDE 138 11/10/2024 06:40 AM       Lab Results   Component Value Date/Time    TROPONINT 27 (H) 11/13/2024 0800     No results found for: \"NTPROBNP\"  Lab Results   Component Value Date/Time    HBA1C 5.4 11/10/2024 0640     Cardiac Imaging and Procedures Review (personally reviewed and notable for):    EKG dated 11/13/2024: Sinus bradycardia, 52 bpm, nonspecific ST depression diffuse, borderline interventricular conduction delay, compared to prior ECG dated 11/12/2024 at 2232, heart rate has improved from 32-52    Echo dated 11/11/2024: Normal left and right ventricular systolic function.  No significant valvular stenosis or regurgitation.  Aortic root mildly enlarged at 4.0 cm    Radiology test Review:  EC-ECHOCARDIOGRAM COMPLETE W/O CONT   Final Result      MR-BRAIN-W/O   Final Result      1.  Acute to subacute left posterior frontal and parietal infarcts. No definite evidence of hemorrhagic transformation.   2.  Mild diffuse cerebral and cerebellar substance loss.   3.  Mild microangiopathic ischemic change versus demyelination or gliosis.   4.  Chronic ischemic pontine gliosis.      IR-CERV CAROTID STENT WITH PROTECTION    (Results Pending)       Thank you for allowing me to participate in the care of this patient.  Please contact me with any questions.      Aracely Fu M.D.  Cardiologist, [facility]     This note was " generated using voice recognition software which has a small chance of producing errors of grammar and possibly content. I have made every reasonable attempt to find and correct any obvious errors, but expect that some may not be found prior to finalization of this note.

## 2024-11-13 NOTE — ASSESSMENT & PLAN NOTE
Stroke likely atheroembolic related to left common and internal carotid artery stenosis.  Sinus bradycardia post-intervention.    Plan:  Continue DAPT with aspirin and Plavix  Keep systolic blood pressure 110-140  Neurocheck every 4 hours  Tolerating oral intake  Mobility level 4  PT OT to assist

## 2024-11-13 NOTE — ASSESSMENT & PLAN NOTE
Bradycardia 11/12with sinus pauses and prolonged UT interval. It appears per telemetry that he may have a second or third egree block. This has not yet occurred while awake.  Overnight there was no sinus pauses or evidence of high-grade AV block  This morning patient is on normal sinus during my exam    Plan:  Cardiology is following  --Outpatient sleep study

## 2024-11-13 NOTE — CARE PLAN
The patient is Watcher - Medium risk of patient condition declining or worsening    Shift Goals  Clinical Goals: Stable neuro status, pain management  Patient Goals: Get procedure done  Family Goals: SUE    Progress made toward(s) clinical / shift goals:         Problem: Knowledge Deficit - Standard  Goal: Patient and family/care givers will demonstrate understanding of plan of care, disease process/condition, diagnostic tests and medications  Outcome: Progressing     Problem: Optimal Care of the Stroke Patient  Goal: Optimal acute care for the stroke patient  Outcome: Progressing     Problem: Knowledge Deficit - Stroke Education  Goal: Patient's knowledge of stroke and risk factors will improve  Outcome: Progressing     Problem: Neuro Status  Goal: Neuro status will remain stable or improve  Outcome: Progressing     Problem: Self Care  Goal: Patient will have the ability to perform ADLs independently or with assistance (bathe, groom, dress, toilet and feed)  Outcome: Progressing     Problem: Pain - Standard  Goal: Alleviation of pain or a reduction in pain to the patient’s comfort goal  Outcome: Progressing

## 2024-11-13 NOTE — ASSESSMENT & PLAN NOTE
Titrate antihypertensives to post-procedural goal 110-140  Epinephrine has been off since yesterday  Resume antihypertensive as clinically appropriate

## 2024-11-13 NOTE — CONSULTS
Critical Care Consultation    Date of consult: 11/12/2024    Referring Physician  Caitlin Cobos M.D.    Reason for Consultation  Postoperative monitoring    History of Presenting Illness  64 y.o. male with history of hypertension, smoking, who presented on 11/9 with several days of right upper extremity weakness.  CT angio demonstrated left common carotid and internal carotid artery stenosis.  He is now admitted to the ICU after left ICA stent placement via R femoral access.  The procedure was uneventful.    Code Status  Full Code    Review of Systems  Review of Systems   Cardiovascular:  Negative for chest pain.   Neurological:  Positive for weakness. Negative for tingling and headaches.       Past Medical History   has no past medical history on file.    Surgical History   has no past surgical history on file.    Family History  family history is not on file.    Social History   reports that he has been smoking cigarettes. He started smoking about 59 years ago. He has a 89.8 pack-year smoking history. He has never used smokeless tobacco.    Medications  Home Medications       Reviewed by Bita Garner (Pharmacy Tech) on 11/10/24 at 1111  Med List Status: Complete     Medication Last Dose Status        Patient Ravinder Taking any Medications                         Audit from Redirected Encounters    **Home medications have not yet been reviewed for this encounter**       Current Facility-Administered Medications   Medication Dose Route Frequency Provider Last Rate Last Admin    PHENYLEPHRINE HCL-NACL 1-0.9 MG/10ML-% IV SOSY             ATROPINE SULFATE 0.1 MG/ML INJ SOLN             ATROPINE SULFATE 0.1 MG/ML INJ SOLN             niCARdipine (Cardene) 20 mg/200 mL NS Standard Infusion  0-15 mg/hr Intravenous Continuous Katharina Canada M.D.   Held at 11/12/24 1712    NORepinephrine (LEVOPHED) 8 mg in  mL Infusion  0.03-0.3 mcg/kg/min Intravenous PRN Román Doyle M.D.        [START ON  11/13/2024] multivitamin tablet 1 Tablet  1 Tablet Oral DAILY Román Doyle M.D.        clopidogrel (Plavix) tablet 75 mg  75 mg Oral DAILY Leonard Agrawal M.D.   75 mg at 11/12/24 0532    acetaminophen (Tylenol) tablet 650 mg  650 mg Oral Q6HRS PRDILLON Mares M.D.        Pharmacy Consult Request ...Pain Management Review 1 Each  1 Each Other PHARMACY TO DOSE Angela Mares M.D.        oxyCODONE immediate-release (Roxicodone) tablet 2.5 mg  2.5 mg Oral Q3HRS PRN Angela Mares M.D.   2.5 mg at 11/10/24 0933    Or    oxyCODONE immediate-release (Roxicodone) tablet 5 mg  5 mg Oral Q3HRS PRN Angela Mares M.D.   5 mg at 11/11/24 1612    Or    morphine 4 MG/ML injection 2 mg  2 mg Intravenous Q3HRS PRDILLON Mares M.D.   2 mg at 11/11/24 1756    senna-docusate (Pericolace Or Senokot S) 8.6-50 MG per tablet 2 Tablet  2 Tablet Oral Q EVENING Angela Mares M.D.   2 Tablet at 11/12/24 1725    And    polyethylene glycol/lytes (Miralax) Packet 1 Packet  1 Packet Oral QDAY PRN Angela Mares M.D.        ondansetron (Zofran) syringe/vial injection 4 mg  4 mg Intravenous Q4HRS PRN Angela Mares M.D.        ondansetron (Zofran ODT) dispertab 4 mg  4 mg Oral Q4HRS PRN Angela Mares M.D.        atorvastatin (Lipitor) tablet 40 mg  40 mg Oral Q EVENING Angela Mares M.D.   40 mg at 11/12/24 1725    aspirin (Asa) chewable tab 81 mg  81 mg Oral DAILY Angela Mares M.D.   81 mg at 11/12/24 0532    Or    aspirin (Asa) suppository 300 mg  300 mg Rectal DAILY Angela Mares M.D.        hydrALAZINE (Apresoline) injection 10 mg  10 mg Intravenous Q4HRS PRN MARLO PichardoN.P.   10 mg at 11/10/24 0932    nicotine (Nicoderm) 21 MG/24HR 21 mg  21 mg Transdermal Daily-0600 MARLO PichardoN.P.   21 mg at 11/12/24 0531    And    nicotine polacrilex (Nicorette) 2 MG piece 2 mg  2 mg Oral Q HOUR PRN MARLO PichardoNMilyP.           Allergies  No Known Allergies    Vital Signs last 24 hours  Temp:  [36 °C (96.8 °F)-37.1 °C (98.8 °F)] 36.7 °C (98.1  °F)  Pulse:  [43-77] 44  Resp:  [10-31] 29  BP: ()/() 95/53  SpO2:  [88 %-98 %] 98 %    Physical Exam  Physical Exam  Vitals and nursing note reviewed.   Constitutional:       General: He is not in acute distress.     Appearance: He is not toxic-appearing.   HENT:      Head: Normocephalic and atraumatic.   Cardiovascular:      Rate and Rhythm: Regular rhythm.      Comments: bradycardia  Pulmonary:      Effort: No respiratory distress.   Abdominal:      General: There is no distension.      Palpations: Abdomen is soft.   Musculoskeletal:         General: No swelling.      Comments: R fem site soft   Skin:     General: Skin is warm.   Neurological:      Mental Status: He is alert.      Comments: Awake, conversing, fluent speech  No notable dysarthria  Lifts all four extremities antigravity  4+/5 R hand          Fluids    Intake/Output Summary (Last 24 hours) at 11/12/2024 1750  Last data filed at 11/12/2024 1730  Gross per 24 hour   Intake 600 ml   Output --   Net 600 ml       Laboratory  Recent Results (from the past 48 hours)   CBC WITHOUT DIFFERENTIAL    Collection Time: 11/11/24  7:54 AM   Result Value Ref Range    WBC 10.0 4.8 - 10.8 K/uL    RBC 5.23 4.70 - 6.10 M/uL    Hemoglobin 17.7 14.0 - 18.0 g/dL    Hematocrit 51.0 42.0 - 52.0 %    MCV 97.5 81.4 - 97.8 fL    MCH 33.8 (H) 27.0 - 33.0 pg    MCHC 34.7 32.3 - 36.5 g/dL    RDW 54.8 (H) 35.9 - 50.0 fL    Platelet Count 143 (L) 164 - 446 K/uL    MPV 11.6 9.0 - 12.9 fL   Comp Metabolic Panel    Collection Time: 11/11/24  7:54 AM   Result Value Ref Range    Sodium 136 135 - 145 mmol/L    Potassium 3.8 3.6 - 5.5 mmol/L    Chloride 103 96 - 112 mmol/L    Co2 21 20 - 33 mmol/L    Anion Gap 12.0 7.0 - 16.0    Glucose 101 (H) 65 - 99 mg/dL    Bun 13 8 - 22 mg/dL    Creatinine 1.09 0.50 - 1.40 mg/dL    Calcium 11.2 (H) 8.5 - 10.5 mg/dL    Correct Calcium 11.0 (H) 8.5 - 10.5 mg/dL    AST(SGOT) 23 12 - 45 U/L    ALT(SGPT) 19 2 - 50 U/L    Alkaline  Phosphatase 103 (H) 30 - 99 U/L    Total Bilirubin 0.9 0.1 - 1.5 mg/dL    Albumin 4.3 3.2 - 4.9 g/dL    Total Protein 7.3 6.0 - 8.2 g/dL    Globulin 3.0 1.9 - 3.5 g/dL    A-G Ratio 1.4 g/dL   ESTIMATED GFR    Collection Time: 11/11/24  7:54 AM   Result Value Ref Range    GFR (CKD-EPI) 75 >60 mL/min/1.73 m 2   EC-ECHOCARDIOGRAM COMPLETE W/O CONT    Collection Time: 11/11/24  3:01 PM   Result Value Ref Range    Eject.Frac. MOD BP 52.76     Eject.Frac. MOD 4C 54.02     Eject.Frac. MOD 2C 49.41     Left Ventrical Ejection Fraction 55    CBC WITHOUT DIFFERENTIAL    Collection Time: 11/12/24  3:32 AM   Result Value Ref Range    WBC 8.9 4.8 - 10.8 K/uL    RBC 4.92 4.70 - 6.10 M/uL    Hemoglobin 16.8 14.0 - 18.0 g/dL    Hematocrit 47.8 42.0 - 52.0 %    MCV 97.2 81.4 - 97.8 fL    MCH 34.1 (H) 27.0 - 33.0 pg    MCHC 35.1 32.3 - 36.5 g/dL    RDW 53.2 (H) 35.9 - 50.0 fL    Platelet Count 123 (L) 164 - 446 K/uL    MPV 11.7 9.0 - 12.9 fL   Basic Metabolic Panel    Collection Time: 11/12/24  3:32 AM   Result Value Ref Range    Sodium 137 135 - 145 mmol/L    Potassium 4.1 3.6 - 5.5 mmol/L    Chloride 103 96 - 112 mmol/L    Co2 24 20 - 33 mmol/L    Glucose 112 (H) 65 - 99 mg/dL    Bun 15 8 - 22 mg/dL    Creatinine 1.04 0.50 - 1.40 mg/dL    Calcium 10.9 (H) 8.5 - 10.5 mg/dL    Anion Gap 10.0 7.0 - 16.0   MAGNESIUM    Collection Time: 11/12/24  3:32 AM   Result Value Ref Range    Magnesium 2.2 1.5 - 2.5 mg/dL   PLATELET MAPPING WITH BASIC TEG    Collection Time: 11/12/24  3:32 AM   Result Value Ref Range    Reaction Time Initial-R 7.1 4.6 - 9.1 min    React Time Initial Hep 7.1 4.3 - 8.3 min    Clot Kinetics-K 1.8 0.8 - 2.1 min    Clot Angle-Angle 69.6 63.0 - 78.0 degrees    Maximum Clot Strength-MA 54.5 52.0 - 69.0 mm    TEG Functional Fibrinogen(MA) 18.0 15.0 - 32.0 mm    Lysis 30 minutes-LY30 0.3 0.0 - 2.6 %    % Inhibition ADP 40.3 (H) 0.0 - 17.0 %    % Inhibition AA 21.5 (H) 0.0 - 11.0 %    TEG Algorithm Link Algorithm     ESTIMATED GFR    Collection Time: 11/12/24  3:32 AM   Result Value Ref Range    GFR (CKD-EPI) 80 >60 mL/min/1.73 m 2           Assessment/Plan  * Acute stroke due to ischemia (HCC)- (present on admission)  Assessment & Plan  Stroke likely atheroembolic related to left common and internal carotid artery stenosis.  Sinus bradycardia post-intervention.  DAPT per neurointerventional  Statin  Glycemic control  SBP goal 110-140 - PRN norepi  Discussed with Dr. Canada    Thrombocytopenia (HCC)  Assessment & Plan  No clear medication offenders.  No evidence of consumption (hemorrhage, DIC), immune-mediated (ITP, TTP/HUS).    Monitor for bleeding  Check INR/fibrinogen  Multivitamin      Polycythemia  Assessment & Plan  Smoking cessation cousnelling  Outpatient sleep study    Smoking  Assessment & Plan  Nicotine PRN  Cessation counselling    Primary hypertension  Assessment & Plan  Titrate antihypertensives to post-procedural goal 110-140          Discussed patient condition and risk of morbidity and/or mortality with Dr. Canada.    The patient remains critically ill.  Critical care time = 70 minutes in directly providing and coordinating critical care and extensive data review.  No time overlap and excludes procedures.    Román Doyle MD, [facility]  Critical Care Medicine  5:49 PM

## 2024-11-13 NOTE — ASSESSMENT & PLAN NOTE
No clear medication offenders.  No evidence of consumption (hemorrhage, DIC), immune-mediated (ITP, TTP/HUS).    Monitor for bleeding  Check INR/fibrinogen  Multivitamin

## 2024-11-13 NOTE — PROGRESS NOTES
Radiology Progress Note   Author: FATEMEH Mayorga Date & Time created: 11/13/2024  10:44 AM   Date of admission  11/9/2024  Note to reader: this note follows the APSO format rather than the historical SOAP format. Assessment and plan located at the top of the note for ease of use.    Chief Complaint  64 y.o. male admitted 11/9/2024 with right upper extremity weakness      HPI  Patient is a 64 y.o. male with history of hypertension, smoking, who presented on 11/9 with several days of right upper extremity weakness.  CT angio demonstrated left common carotid and internal carotid artery stenosis. EPHRAIM was consulted for left ICA stent placement. EPHRAIM Canada performed Left ICA stent placement on 11/12/24.     Interval History:   11/13/24 - No acute events overnight. Right groin access site soft, non-tender, without ecchymosis; dressing CDI. Labs I reviewed WBC 20.7, H/H 16.1/47.4, Cr 0.97. I discussed the importance of DAPT adherence and post-procedure groin care with patient at bedside all questions answered. Discussed patient care with hospital nursing staff, Intensivist, EPHRAIM Canada, and I reviewed IDT notes.     Assessment/Plan     Principal Problem:    Acute stroke due to ischemia (HCC)  Active Problems:    Primary hypertension    Smoking    Polycythemia    Stenosis of left internal carotid artery    Thrombocytopenia (HCC)      Plan EPHRAIM  - Post EPHRAIM groin access site instructions: no lifting greater than 5 lbs and no baths/swimming/soaking in tub for 7-10 days. Shower OK. OK to change dressings/band aid as needed.  - Continue ASA 81 mg daily and Plavix 75 mg daily for 3 months S/P procedure  - Follow up appointment in 2-4 weeks with OP Neuro IR, our office to call and schedule  - Neuro Checks per ICU policy  - From a Neuro Interventional Service standpoint, OK to discharge when medically cleared.  - EPHRAIM signing off.   -  -Thank you for allowing Interventional Radiology team to participate in the  patients care, if any additional care or requests are needed in the future please do not hesitate to call or place EPHRAIM order           Review of Systems  Physical Exam   Review of Systems   Constitutional:  Negative for chills, fever and malaise/fatigue.   Respiratory:  Negative for cough and shortness of breath.    Cardiovascular:  Negative for chest pain.   Gastrointestinal:  Negative for abdominal pain, nausea and vomiting.   Neurological:  Positive for weakness. Negative for tingling, sensory change, speech change, focal weakness and headaches.      Vitals:    11/13/24 1030   BP: 107/54   Pulse: (!) 53   Resp: 18   Temp:    SpO2: 96%        Physical Exam  Vitals and nursing note reviewed.   Constitutional:       General: He is not in acute distress.  Cardiovascular:      Rate and Rhythm: Bradycardia present.      Comments: Right groin access site soft, non-tender, without ecchymosis; dressing cdi.    Pulmonary:      Effort: Pulmonary effort is normal. No respiratory distress.   Abdominal:      Palpations: Abdomen is soft.      Tenderness: There is no abdominal tenderness.   Skin:     General: Skin is warm and dry.   Neurological:      General: No focal deficit present.      Mental Status: He is alert and oriented to person, place, and time.      Comments: Aox4  PERRLA, no gaze deficits  Face symmetrical  Shoulder shrug intact  Sustained antigravity with no downward drift in all extremities  4+/5 R hand   Sensation intact   Psychiatric:         Mood and Affect: Mood normal.         Behavior: Behavior normal.             Labs    Recent Labs     11/11/24  0754 11/12/24  0332 11/13/24  0114   WBC 10.0 8.9 20.7*   RBC 5.23 4.92 4.77   HEMOGLOBIN 17.7 16.8 16.1   HEMATOCRIT 51.0 47.8 47.4   MCV 97.5 97.2 99.4*   MCH 33.8* 34.1* 33.8*   MCHC 34.7 35.1 34.0   RDW 54.8* 53.2* 54.6*   PLATELETCT 143* 123* 157*   MPV 11.6 11.7 12.1     Recent Labs     11/11/24  0754 11/12/24  0332 11/13/24  0245   SODIUM 136 137 139  "  POTASSIUM 3.8 4.1 2.7*   CHLORIDE 103 103 108   CO2 21 24 17*   GLUCOSE 101* 112* 198*   BUN 13 15 19   CREATININE 1.09 1.04 0.97   CALCIUM 11.2* 10.9* 8.7     Recent Labs     11/11/24  0754 11/12/24  0332 11/13/24  0245   ALBUMIN 4.3  --   --    TBILIRUBIN 0.9  --   --    ALKPHOSPHAT 103*  --   --    TOTPROTEIN 7.3  --   --    ALTSGPT 19  --   --    ASTSGOT 23  --   --    CREATININE 1.09 1.04 0.97     EC-ECHOCARDIOGRAM COMPLETE W/O CONT   Final Result      MR-BRAIN-W/O   Final Result      1.  Acute to subacute left posterior frontal and parietal infarcts. No definite evidence of hemorrhagic transformation.   2.  Mild diffuse cerebral and cerebellar substance loss.   3.  Mild microangiopathic ischemic change versus demyelination or gliosis.   4.  Chronic ischemic pontine gliosis.      IR-CERV CAROTID STENT WITH PROTECTION    (Results Pending)     INR   Date Value Ref Range Status   11/13/2024 1.12 0.87 - 1.13 Final     Comment:     INR - Non-therapeutic Reference Range: 0.87-1.13  INR - Therapeutic Reference Range: 2.0-4.0       No results found for: \"POCINR\"     Intake/Output Summary (Last 24 hours) at 11/13/2024 0707  Last data filed at 11/13/2024 0600  Gross per 24 hour   Intake 1981.7 ml   Output --   Net 1981.7 ml      I have personally reviewed the above labs and imaging      I have performed a physical exam and reviewed and updated ROS and Plan today (11/13/2024).     48 minutes in directly providing and coordinating care and extensive data review.  No time overlap and excludes procedures.   "

## 2024-11-13 NOTE — CARE PLAN
The patient is Watcher - Medium risk of patient condition declining or worsening    Shift Goals  Clinical Goals: Stable neuro exams, Mobilize OOB, wean Vasopressors  Patient Goals: Rest  Family Goals: NA    Progress made toward(s) clinical / shift goals:  stable exam, mobilized    Patient is not progressing towards the following goals: unable to wean vasopressors    Problem: Knowledge Deficit - Standard  Goal: Patient and family/care givers will demonstrate understanding of plan of care, disease process/condition, diagnostic tests and medications  Outcome: Progressing  Note: Education regarding POC discussed w/ patient.        Problem: Pain - Standard  Goal: Alleviation of pain or a reduction in pain to the patient’s comfort goal  Outcome: Progressing  Note: 0-10 pain assessment tool in use to identify pain. Scheduled, PRN and nonpharmacologic measures in place to manage patients pain.

## 2024-11-13 NOTE — PROGRESS NOTES
4 Eyes Skin Assessment Completed by FIOR Esposito and FIOR Odell.    Head WDL  Ears WDL  Nose WDL  Mouth WDL  Neck WDL  Breast/Chest WDL  Shoulder Blades WDL  Spine WDL  (R) Arm/Elbow/Hand WDL  (L) Arm/Elbow/Hand Bruising  Abdomen WDL  Groin Redness and Blanching, R groin site for carotid stent placement   Scrotum/Coccyx/Buttocks Redness and Blanching  (R) Leg WDL  (L) Leg WDL  (R) Heel/Foot/Toe dry heel  (L) Heel/Foot/Toe dry heel          Devices In Places ECG, Blood Pressure Cuff, Pulse Ox, SCD's, and Nasal Cannula      Interventions In Place Gray Ear Foams, NC W/Ear Foams, Pillows, Q2 Turns, and Low Air Loss Mattress    Possible Skin Injury No    Pictures Uploaded Into Epic N/A  Wound Consult Placed N/A  RN Wound Prevention Protocol Ordered No

## 2024-11-13 NOTE — PROGRESS NOTES
"Critical Care Progress Note    Date of admission  11/9/2024    Chief Complaint  \"64 y.o. male with history of hypertension, smoking, who presented on 11/9 with several days of right upper extremity weakness.  CT angio demonstrated left common carotid and internal carotid artery stenosis.  He is now admitted to the ICU after left ICA stent placement via R femoral access.  The procedure was uneventful.\"--Dr. Funk's H&P     Hospital Course  11/13: Admitted to RICU following L ICA stent    Interval Problem Update  Reviewed last 24 hour events:  Remains critically ill  Overnight had ongoing bradycardia with intermittent block  On Epinephrine    Review of Systems  Review of Systems   All other systems reviewed and are negative.       Vital Signs for last 24 hours   Temp:  [36.4 °C (97.6 °F)-37.1 °C (98.8 °F)] 36.7 °C (98 °F)  Pulse:  [26-77] 53  Resp:  [9-52] 18  BP: ()/() 107/54  SpO2:  [88 %-100 %] 96 %    Hemodynamic parameters for last 24 hours       Respiratory Information for the last 24 hours       Physical Exam   Physical Exam  Vitals and nursing note reviewed.   Constitutional:       Appearance: Normal appearance.   HENT:      Head: Normocephalic and atraumatic.      Mouth/Throat:      Mouth: Mucous membranes are moist.   Eyes:      Extraocular Movements: Extraocular movements intact.   Cardiovascular:      Rate and Rhythm: Regular rhythm. Bradycardia present.   Pulmonary:      Effort: Pulmonary effort is normal.   Abdominal:      General: Abdomen is flat.      Palpations: Abdomen is soft.   Skin:     General: Skin is warm and dry.      Capillary Refill: Capillary refill takes less than 2 seconds.   Neurological:      General: No focal deficit present.      Mental Status: He is alert and oriented to person, place, and time.   Psychiatric:         Mood and Affect: Mood normal.         Thought Content: Thought content normal.         Medications  Current Facility-Administered Medications   Medication " Dose Route Frequency Provider Last Rate Last Admin    insulin lispro (HumaLOG,AdmeLOG) subcutaneous injection  2-9 Units Subcutaneous 4X/DAY ACHS Malcolm Sal, D.O.        And    dextrose 50% (D50W) injection 25 g  25 g Intravenous Q15 MIN PRN Malcolm Marissa, D.O.        niCARdipine (Cardene) 20 mg/200 mL NS Standard Infusion  0-15 mg/hr Intravenous Continuous Katharina Canada M.D.   Held at 11/12/24 1712    multivitamin tablet 1 Tablet  1 Tablet Oral DAILY Román Doyle M.D.   1 Tablet at 11/13/24 0505    norepinephrine (Levophed) 8 mg in 250 mL NS infusion (premix)  0.01-0.3 mcg/kg/min (Ideal) Intravenous Continuous Román Doyle M.D.   Stopped at 11/13/24 0104    atropine injection 0.5 mg  0.5 mg Intravenous Once PRN Marily LMily Latona        EPINEPHrine (Adrenalin) infusion 4 mg/250 mL (premix)  0-0.5 mcg/kg/min (Ideal) Intravenous Continuous Marily LMily Latona 18.5 mL/hr at 11/13/24 1007 0.06 mcg/kg/min at 11/13/24 1007    clopidogrel (Plavix) tablet 75 mg  75 mg Oral DAILY Leonard Agrawal M.D.   75 mg at 11/13/24 0505    acetaminophen (Tylenol) tablet 650 mg  650 mg Oral Q6HRS PRN Angela Mares M.D.   650 mg at 11/12/24 2123    Pharmacy Consult Request ...Pain Management Review 1 Each  1 Each Other PHARMACY TO DOSE Angela Mares M.D.        oxyCODONE immediate-release (Roxicodone) tablet 2.5 mg  2.5 mg Oral Q3HRS PRN Angela Mares M.D.   2.5 mg at 11/12/24 2123    Or    oxyCODONE immediate-release (Roxicodone) tablet 5 mg  5 mg Oral Q3HRS PRN Angela Mares M.D.   5 mg at 11/11/24 1612    Or    morphine 4 MG/ML injection 2 mg  2 mg Intravenous Q3HRS PRN Angela Mares M.D.   2 mg at 11/11/24 1756    senna-docusate (Pericolace Or Senokot S) 8.6-50 MG per tablet 2 Tablet  2 Tablet Oral Q EVENING Angela Mares M.D.   2 Tablet at 11/12/24 1725    And    polyethylene glycol/lytes (Miralax) Packet 1 Packet  1 Packet Oral QDAY PRN Angela Mares M.D.        ondansetron (Zofran) syringe/vial injection 4 mg  4 mg  Intravenous Q4HRS PRN Angela Mares M.D.        ondansetron (Zofran ODT) dispertab 4 mg  4 mg Oral Q4HRS PRN Angela Mares M.D.        atorvastatin (Lipitor) tablet 40 mg  40 mg Oral Q EVENING Angela Mares M.D.   40 mg at 11/12/24 1725    aspirin (Asa) chewable tab 81 mg  81 mg Oral DAILY Angela Mares M.D.   81 mg at 11/13/24 0505    Or    aspirin (Asa) suppository 300 mg  300 mg Rectal DAILY Angela Mares M.D.        hydrALAZINE (Apresoline) injection 10 mg  10 mg Intravenous Q4HRS PRN MARLO PichardoN.P.   10 mg at 11/10/24 0932    nicotine (Nicoderm) 21 MG/24HR 21 mg  21 mg Transdermal Daily-0600 MARLO PichardoN.P.   21 mg at 11/13/24 0505    And    nicotine polacrilex (Nicorette) 2 MG piece 2 mg  2 mg Oral Q HOUR PRN MARLO PichardoN.P.           Fluids    Intake/Output Summary (Last 24 hours) at 11/13/2024 1209  Last data filed at 11/13/2024 0800  Gross per 24 hour   Intake 2280.28 ml   Output --   Net 2280.28 ml       Laboratory          Recent Labs     11/11/24 0754 11/12/24  0332 11/13/24  0245   SODIUM 136 137 139   POTASSIUM 3.8 4.1 2.7*   CHLORIDE 103 103 108   CO2 21 24 17*   BUN 13 15 19   CREATININE 1.09 1.04 0.97   MAGNESIUM  --  2.2 1.7   CALCIUM 11.2* 10.9* 8.7     Recent Labs     11/11/24 0754 11/12/24  0332 11/13/24  0245   ALTSGPT 19  --   --    ASTSGOT 23  --   --    ALKPHOSPHAT 103*  --   --    TBILIRUBIN 0.9  --   --    GLUCOSE 101* 112* 198*     Recent Labs     11/11/24  0754 11/12/24  0332 11/13/24  0114   WBC 10.0 8.9 20.7*   ASTSGOT 23  --   --    ALTSGPT 19  --   --    ALKPHOSPHAT 103*  --   --    TBILIRUBIN 0.9  --   --      Recent Labs     11/11/24  0754 11/12/24  0332 11/13/24  0114 11/13/24  0245   RBC 5.23 4.92 4.77  --    HEMOGLOBIN 17.7 16.8 16.1  --    HEMATOCRIT 51.0 47.8 47.4  --    PLATELETCT 143* 123* 157*  --    PROTHROMBTM  --   --   --  14.5   INR  --   --   --  1.12       Imaging  X-Ray:  I have personally reviewed the images and compared with prior images.  EKG:   I have personally reviewed the images and compared with prior images.  CT:    Reviewed    Assessment/Plan  * Acute stroke due to ischemia (HCC)- (present on admission)  Assessment & Plan  Stroke likely atheroembolic related to left common and internal carotid artery stenosis.  Sinus bradycardia post-intervention.  Plan:  --Discussed with Vascular Neurology and Neur IR  --DAPT  --Euglycemia,   ---140 (titrate epinephrine off as able)      Bradycardia  Assessment & Plan  Bradycardia overnight with sinus pauses and prolonged DE interval. It appears per telemetry that he may have a second or third egree block. This has not yet occurred while awake.  Plan:  --Cardiology consulted  --Outpatient sleep study    Thrombocytopenia (HCC)  Assessment & Plan  No clear medication offenders.  No evidence of consumption (hemorrhage, DIC), immune-mediated (ITP, TTP/HUS).    Monitor for bleeding  Check INR/fibrinogen  Multivitamin      Polycythemia  Assessment & Plan  Smoking cessation cousnelling  Outpatient sleep study    Smoking  Assessment & Plan  Nicotine PRN  Cessation counselling    Primary hypertension  Assessment & Plan  Titrate antihypertensives to post-procedural goal 110-140  Plan:  --Titrate off epinephrine as able           VTE:  Not Indicated  Ulcer: Not Indicated  Lines: None    I have performed a physical exam and reviewed and updated ROS and Plan today (11/13/2024). In review of yesterday's note (11/12/2024), there are no changes except as documented above.     Discussed patient condition and risk of morbidity and/or mortality with RN, RT, Charge nurse / hot rounds, Patient, and cardiology and neurology  The patient remains critically ill.  Critical care time = 74 minutes in directly providing and coordinating critical care and extensive data review.  No time overlap and excludes procedures.    Malcolm Huang DO MPH  Critical Care Medicine

## 2024-11-14 PROBLEM — R57.9 SHOCK (HCC): Status: ACTIVE | Noted: 2024-11-14

## 2024-11-14 PROBLEM — I65.22 STENOSIS OF LEFT INTERNAL CAROTID ARTERY: Status: RESOLVED | Noted: 2024-11-10 | Resolved: 2024-11-14

## 2024-11-14 PROBLEM — R57.9 SHOCK (HCC): Status: RESOLVED | Noted: 2024-11-14 | Resolved: 2024-11-14

## 2024-11-14 PROBLEM — I65.22 CAROTID STENOSIS, LEFT: Status: ACTIVE | Noted: 2024-11-14

## 2024-11-14 LAB
ANION GAP SERPL CALC-SCNC: 7 MMOL/L (ref 7–16)
BUN SERPL-MCNC: 21 MG/DL (ref 8–22)
CALCIUM SERPL-MCNC: 10.3 MG/DL (ref 8.5–10.5)
CHLORIDE SERPL-SCNC: 106 MMOL/L (ref 96–112)
CO2 SERPL-SCNC: 22 MMOL/L (ref 20–33)
CREAT SERPL-MCNC: 1.01 MG/DL (ref 0.5–1.4)
EKG IMPRESSION: NORMAL
ERYTHROCYTE [DISTWIDTH] IN BLOOD BY AUTOMATED COUNT: 54.9 FL (ref 35.9–50)
GFR SERPLBLD CREATININE-BSD FMLA CKD-EPI: 83 ML/MIN/1.73 M 2
GLUCOSE BLD STRIP.AUTO-MCNC: 101 MG/DL (ref 65–99)
GLUCOSE BLD STRIP.AUTO-MCNC: 113 MG/DL (ref 65–99)
GLUCOSE BLD STRIP.AUTO-MCNC: 142 MG/DL (ref 65–99)
GLUCOSE BLD STRIP.AUTO-MCNC: 150 MG/DL (ref 65–99)
GLUCOSE SERPL-MCNC: 109 MG/DL (ref 65–99)
HCT VFR BLD AUTO: 43.6 % (ref 42–52)
HGB BLD-MCNC: 15.3 G/DL (ref 14–18)
MAGNESIUM SERPL-MCNC: 2.3 MG/DL (ref 1.5–2.5)
MCH RBC QN AUTO: 34.7 PG (ref 27–33)
MCHC RBC AUTO-ENTMCNC: 35.1 G/DL (ref 32.3–36.5)
MCV RBC AUTO: 98.9 FL (ref 81.4–97.8)
PLATELET # BLD AUTO: 121 K/UL (ref 164–446)
PMV BLD AUTO: 11.8 FL (ref 9–12.9)
POTASSIUM SERPL-SCNC: 5 MMOL/L (ref 3.6–5.5)
RBC # BLD AUTO: 4.41 M/UL (ref 4.7–6.1)
SODIUM SERPL-SCNC: 135 MMOL/L (ref 135–145)
WBC # BLD AUTO: 10.8 K/UL (ref 4.8–10.8)

## 2024-11-14 PROCEDURE — 93010 ELECTROCARDIOGRAM REPORT: CPT | Performed by: INTERNAL MEDICINE

## 2024-11-14 PROCEDURE — A9270 NON-COVERED ITEM OR SERVICE: HCPCS | Performed by: STUDENT IN AN ORGANIZED HEALTH CARE EDUCATION/TRAINING PROGRAM

## 2024-11-14 PROCEDURE — 82962 GLUCOSE BLOOD TEST: CPT

## 2024-11-14 PROCEDURE — 770020 HCHG ROOM/CARE - TELE (206)

## 2024-11-14 PROCEDURE — 80048 BASIC METABOLIC PNL TOTAL CA: CPT

## 2024-11-14 PROCEDURE — 99233 SBSQ HOSP IP/OBS HIGH 50: CPT | Performed by: INTERNAL MEDICINE

## 2024-11-14 PROCEDURE — 93005 ELECTROCARDIOGRAM TRACING: CPT | Performed by: INTERNAL MEDICINE

## 2024-11-14 PROCEDURE — 85027 COMPLETE CBC AUTOMATED: CPT

## 2024-11-14 PROCEDURE — A9270 NON-COVERED ITEM OR SERVICE: HCPCS | Performed by: EMERGENCY MEDICINE

## 2024-11-14 PROCEDURE — A9270 NON-COVERED ITEM OR SERVICE: HCPCS

## 2024-11-14 PROCEDURE — 700102 HCHG RX REV CODE 250 W/ 637 OVERRIDE(OP): Performed by: STUDENT IN AN ORGANIZED HEALTH CARE EDUCATION/TRAINING PROGRAM

## 2024-11-14 PROCEDURE — 83735 ASSAY OF MAGNESIUM: CPT

## 2024-11-14 PROCEDURE — A9270 NON-COVERED ITEM OR SERVICE: HCPCS | Performed by: PSYCHIATRY & NEUROLOGY

## 2024-11-14 PROCEDURE — 700102 HCHG RX REV CODE 250 W/ 637 OVERRIDE(OP)

## 2024-11-14 PROCEDURE — 700102 HCHG RX REV CODE 250 W/ 637 OVERRIDE(OP): Performed by: EMERGENCY MEDICINE

## 2024-11-14 PROCEDURE — 99233 SBSQ HOSP IP/OBS HIGH 50: CPT | Performed by: HOSPITALIST

## 2024-11-14 PROCEDURE — 700102 HCHG RX REV CODE 250 W/ 637 OVERRIDE(OP): Performed by: PSYCHIATRY & NEUROLOGY

## 2024-11-14 RX ADMIN — NICOTINE TRANSDERMAL SYSTEM 21 MG: 21 PATCH, EXTENDED RELEASE TRANSDERMAL at 05:25

## 2024-11-14 RX ADMIN — CLOPIDOGREL BISULFATE 75 MG: 75 TABLET ORAL at 05:25

## 2024-11-14 RX ADMIN — TRAZODONE HYDROCHLORIDE 50 MG: 100 TABLET ORAL at 21:39

## 2024-11-14 RX ADMIN — Medication 5 MG: at 21:39

## 2024-11-14 RX ADMIN — ATORVASTATIN CALCIUM 40 MG: 40 TABLET, FILM COATED ORAL at 17:59

## 2024-11-14 RX ADMIN — THERA TABS 1 TABLET: TAB at 05:25

## 2024-11-14 RX ADMIN — ASPIRIN 81 MG: 81 TABLET, CHEWABLE ORAL at 05:25

## 2024-11-14 ASSESSMENT — ENCOUNTER SYMPTOMS
PALPITATIONS: 0
SPEECH CHANGE: 0
SHORTNESS OF BREATH: 0

## 2024-11-14 ASSESSMENT — PAIN DESCRIPTION - PAIN TYPE
TYPE: ACUTE PAIN

## 2024-11-14 NOTE — CARE PLAN
The patient is Stable - Low risk of patient condition declining or worsening    Shift Goals  Clinical Goals: stable neuro exams, if bradycardic assess for symptoms  Patient Goals: sleep  Family Goals: SUE    Progress made toward(s) clinical / shift goals:  all goals achieved    Patient is not progressing towards the following goals: NA      Problem: Neuro Status  Goal: Neuro status will remain stable or improve  Outcome: Progressing  Note: Q4 hour neuro checks stable overnight     Problem: Pain - Standard  Goal: Alleviation of pain or a reduction in pain to the patient’s comfort goal  Outcome: Progressing  Note: No complaints of pain overnight     Problem: Mobility - Stroke  Goal: Patient's capacity to carry out activities will improve  Outcome: Progressing  Flowsheets (Taken 11/14/2024 0210)  Level of Mobility: Level IV  Note: Patient mobilized to the bathroom without difficulty, steady gait, no assistive devices required     Problem: Hemodynamic Monitoring  Goal: Patient's hemodynamics, fluid balance and neurologic status will be stable or improve  Outcome: Progressing  Note: Heart rate between 36-50, sinus rhythm, blood pressures stable throughout shift and patient asymptomatic

## 2024-11-14 NOTE — CARE PLAN
Problem: Knowledge Deficit - Stroke Education  Goal: Patient's knowledge of stroke and risk factors will improve  Outcome: Progressing     Problem: Psychosocial - Patient Condition  Goal: Patient's ability to verbalize feelings about condition will improve  Outcome: Progressing  Goal: Patient's ability to re-evaluate and adapt role responsibilities will improve  Outcome: Progressing     Problem: Pain - Standard  Goal: Alleviation of pain or a reduction in pain to the patient’s comfort goal  Outcome: Progressing

## 2024-11-14 NOTE — PROGRESS NOTES
Hospital Medicine Daily Progress Note    Date of Service  11/14/2024    Chief Complaint  Christopher Brown is a 64 y.o. male admitted 11/9/2024 with     Hospital Course  Christopher Brown is a 64-year-old male with PMHx HTN, current tobacco abuse.  Admitted 11/9 for right upper extremity weakness.    Per history-patient presented to an outside facility for right upper extremity weakness that started on Wednesday 11/6.  Patient did not seek medical attention until 11/9.      CTA head showing low-density in the left parieto-occipital region suggesting an area of previous infarction.  CTA head and neck: High-grade stenosis of left distal carotid artery with moderate stenosis of the internal carotid artery.    MRI brain: Acute and subacute left posterior frontal and parietal infarcts.  No definitive evidence of hemorrhagic transformation.    Neurology was consulted upon arrival.   Due to elevated blood pressures he was initiated on oral antihypertensive meds. On 11/12 he underwent left internal carotid stent placement by IR and was transferred to the ICU postoperatively. He developed a transient second degree type I AV block with pauses for which cardiology was consulted. Due to bradycardia, he required an IV epinephrine drip.  Interval Problem Update  11/14: Mr. Brown was evaluated and examined in the ICU.  He has transfer orders to telemetry.  He is bradycardic in the 40s and varying rhythm including sinus, with the first-degree and second-degree AV block's though no atrial fibrillation.  Is on dual antiplatelet therapy and statin.  Blood pressures in the 130s.    I have discussed this patient's plan of care and discharge plan at IDT rounds today with Case Management, Nursing, Nursing leadership, and other members of the IDT team.    Consultants/Specialty  Critical care  IR  Neurology    Code Status  Full Code    Disposition  The patient is not medically cleared for discharge to home or a post-acute  facility.  Anticipate discharge to: home with close outpatient follow-up    I have placed the appropriate orders for post-discharge needs.    Review of Systems  Review of Systems   Respiratory:  Negative for shortness of breath.    Cardiovascular:  Negative for chest pain, palpitations and leg swelling.   Neurological:  Negative for speech change.        Mild weakness and decreased dexterity right hand   All other systems reviewed and are negative.       Physical Exam  Temp:  [36.6 °C (97.8 °F)-36.8 °C (98.2 °F)] 36.7 °C (98 °F)  Pulse:  [34-63] 34  Resp:  [12-23] 12  BP: ()/(48-97) 117/56  SpO2:  [88 %-99 %] 91 %    Physical Exam  Vitals and nursing note reviewed.   Constitutional:       General: He is not in acute distress.     Appearance: He is not toxic-appearing.   Cardiovascular:      Rate and Rhythm: Bradycardia present. Rhythm irregular.   Pulmonary:      Effort: Pulmonary effort is normal.      Breath sounds: Normal breath sounds.   Abdominal:      General: There is no distension.      Tenderness: There is no abdominal tenderness.   Neurological:      Mental Status: He is alert and oriented to person, place, and time.      Comments: Mild deficit right hand strength and dexterity   Psychiatric:         Mood and Affect: Mood normal.         Behavior: Behavior normal.         Fluids    Intake/Output Summary (Last 24 hours) at 11/14/2024 0727  Last data filed at 11/14/2024 0400  Gross per 24 hour   Intake 1003.42 ml   Output 650 ml   Net 353.42 ml        Laboratory  Recent Labs     11/12/24  0332 11/13/24  0114 11/14/24  0520   WBC 8.9 20.7* 10.8   RBC 4.92 4.77 4.41*   HEMOGLOBIN 16.8 16.1 15.3   HEMATOCRIT 47.8 47.4 43.6   MCV 97.2 99.4* 98.9*   MCH 34.1* 33.8* 34.7*   MCHC 35.1 34.0 35.1   RDW 53.2* 54.6* 54.9*   PLATELETCT 123* 157* 121*   MPV 11.7 12.1 11.8     Recent Labs     11/12/24  0332 11/13/24  0245 11/14/24  0520   SODIUM 137 139 135   POTASSIUM 4.1 2.7* 5.0   CHLORIDE 103 108 106   CO2 24  17* 22   GLUCOSE 112* 198* 109*   BUN 15 19 21   CREATININE 1.04 0.97 1.01   CALCIUM 10.9* 8.7 10.3     Recent Labs     11/13/24  0245   INR 1.12               Imaging  EC-ECHOCARDIOGRAM COMPLETE W/O CONT   Final Result      MR-BRAIN-W/O   Final Result      1.  Acute to subacute left posterior frontal and parietal infarcts. No definite evidence of hemorrhagic transformation.   2.  Mild diffuse cerebral and cerebellar substance loss.   3.  Mild microangiopathic ischemic change versus demyelination or gliosis.   4.  Chronic ischemic pontine gliosis.      IR-CERV CAROTID STENT WITH PROTECTION    (Results Pending)        Assessment/Plan  * Acute stroke due to ischemia (HCC)- (present on admission)  Assessment & Plan  Onset on 11/6 Wednesday, presenting to hospital 11/9  CT head notes low density in the L parieto-occipital region suggests an area of previous infarction.   CTA head and neck noted high grade stenosis of the L distal common carotid artery with moderate stenosis of the internal carotid artery.   MRI of the brain confirmed acute and subacute left posterior frontal and parietal infarcts.  No evidence of hemorrhagic transformation.  Carotid stent placed 11/12/2024.  - Continue DAPT with clopdiogrel and aspirin and DAPT for 3 months (end date 2/11/2025) then aspirin monotherapy  - Continue Atorvastatin    - PT OT ST  Echocardiogram reveals a normal ejection fraction no evidence of thrombus.  He has not been in afib    Carotid stenosis, left- (present on admission)  Assessment & Plan  Status post stent by interventional radiology on 11/12/2024  Dual antiplatelet therapy and statin    Bradycardia  Assessment & Plan  Requiring IV epinephrine drip in the ICU which has now been turned off though he remains bradycardic  Cardiology has consulted  He has varying AV blocks type I and II  Continuous telemetry monitoring    Thrombocytopenia (HCC)- (present on admission)  Assessment & Plan  Platelets  121    Polycythemia  Assessment & Plan  Hb 16.4  - Continue to monitor     Smoking  Assessment & Plan  Cessation encouraged    Primary hypertension  Assessment & Plan  Antihypertensives were initiated but held postprocedure due to shock.  They will be restarted accordingly.         VTE prophylaxis:   SCDs/TEDs      I have performed a physical exam and reviewed and updated ROS and Plan today (11/14/2024). In review of yesterday's note (11/13/2024), there are no changes except as documented above.

## 2024-11-14 NOTE — PROGRESS NOTES
"Critical Care Progress Note    Date of admission  11/9/2024    Chief Complaint  \"64 y.o. male with history of hypertension, smoking, who presented on 11/9 with several days of right upper extremity weakness.  CT angio demonstrated left common carotid and internal carotid artery stenosis.  He is now admitted to the ICU after left ICA stent placement via R femoral access.  The procedure was uneventful.\"--Dr. Funk's H&P     Hospital Course  11/13: Admitted to RICU following L ICA stent    Interval Problem Update  Reviewed last 24 hour events:  Patient is comfortable this morning  No acute issues overnight  Heart rate in 30s to 40s sinus rhythm  Off epinephrine  Alert oriented.  Tolerating oral intake  Blood pressure ranges from 100-110s  On room air  CBC and BMP are unremarkable    Review of Systems  Review of Systems   All other systems reviewed and are negative.       Vital Signs for last 24 hours   Temp:  [36.6 °C (97.8 °F)-36.8 °C (98.2 °F)] 36.7 °C (98 °F)  Pulse:  [34-63] 34  Resp:  [12-23] 12  BP: ()/(48-97) 117/56  SpO2:  [88 %-99 %] 91 %    Hemodynamic parameters for last 24 hours       Respiratory Information for the last 24 hours       Physical Exam   Physical Exam  Vitals and nursing note reviewed.   Constitutional:       Appearance: Normal appearance.   HENT:      Head: Normocephalic and atraumatic.      Mouth/Throat:      Mouth: Mucous membranes are moist.   Eyes:      Extraocular Movements: Extraocular movements intact.   Cardiovascular:      Rate and Rhythm: Regular rhythm. Bradycardia present.   Pulmonary:      Effort: Pulmonary effort is normal.   Abdominal:      General: Abdomen is flat.      Palpations: Abdomen is soft.   Skin:     General: Skin is warm and dry.      Capillary Refill: Capillary refill takes less than 2 seconds.   Neurological:      General: No focal deficit present.      Mental Status: He is alert and oriented to person, place, and time.   Psychiatric:         Mood and " Affect: Mood normal.         Thought Content: Thought content normal.         Medications  Current Facility-Administered Medications   Medication Dose Route Frequency Provider Last Rate Last Admin    insulin lispro (HumaLOG,AdmeLOG) subcutaneous injection  2-9 Units Subcutaneous 4X/DAY ACHS Malcolm Dillwyn, D.O.        And    dextrose 50% (D50W) injection 25 g  25 g Intravenous Q15 MIN PRN Malcolm Dillwyn, D.O.        melatonin tablet 5 mg  5 mg Oral Nightly Román Doyle M.D.   5 mg at 11/13/24 2048    traZODone (Desyrel) tablet 50 mg  50 mg Oral QHS Román Doyle M.D.   50 mg at 11/13/24 2048    niCARdipine (Cardene) 20 mg/200 mL NS Standard Infusion  0-15 mg/hr Intravenous Continuous Katharina Canada M.D.   Held at 11/12/24 1712    multivitamin tablet 1 Tablet  1 Tablet Oral DAILY Román Doyle M.D.   1 Tablet at 11/14/24 0525    norepinephrine (Levophed) 8 mg in 250 mL NS infusion (premix)  0.01-0.3 mcg/kg/min (Ideal) Intravenous Continuous Román Doyle M.D.   Stopped at 11/13/24 0104    atropine injection 0.5 mg  0.5 mg Intravenous Once PRN Marily Palacios        EPINEPHrine (Adrenalin) infusion 4 mg/250 mL (premix)  0-0.5 mcg/kg/min (Ideal) Intravenous Continuous Marily Palacios   Stopped at 11/13/24 1235    clopidogrel (Plavix) tablet 75 mg  75 mg Oral DAILY Leonard Agrawal M.D.   75 mg at 11/14/24 0525    acetaminophen (Tylenol) tablet 650 mg  650 mg Oral Q6HRS PRN Angela Mares M.D.   650 mg at 11/12/24 2123    Pharmacy Consult Request ...Pain Management Review 1 Each  1 Each Other PHARMACY TO DOSE Angela Mares M.D.        oxyCODONE immediate-release (Roxicodone) tablet 2.5 mg  2.5 mg Oral Q3HRS PRN Angela Mares M.D.   2.5 mg at 11/12/24 2123    Or    oxyCODONE immediate-release (Roxicodone) tablet 5 mg  5 mg Oral Q3HRS SANDRA Mares M.D.   5 mg at 11/11/24 1612    Or    morphine 4 MG/ML injection 2 mg  2 mg Intravenous Q3HRS SANDRA Mares M.D.   2 mg at 11/11/24 4770    senna-docusate  (Pericolace Or Senokot S) 8.6-50 MG per tablet 2 Tablet  2 Tablet Oral Q EVENING Angela Mares M.D.   2 Tablet at 11/12/24 1725    And    polyethylene glycol/lytes (Miralax) Packet 1 Packet  1 Packet Oral QDAY PRN Angela Mares M.D.        ondansetron (Zofran) syringe/vial injection 4 mg  4 mg Intravenous Q4HRS PRN Angela Mares M.D.        ondansetron (Zofran ODT) dispertab 4 mg  4 mg Oral Q4HRS PRN Angela Mares M.D.        atorvastatin (Lipitor) tablet 40 mg  40 mg Oral Q EVENING Angela Mares M.D.   40 mg at 11/13/24 1714    aspirin (Asa) chewable tab 81 mg  81 mg Oral DAILY Angela Mares M.D.   81 mg at 11/14/24 0525    Or    aspirin (Asa) suppository 300 mg  300 mg Rectal DAILY Angela Mares M.D.        hydrALAZINE (Apresoline) injection 10 mg  10 mg Intravenous Q4HRS PRN MARLO PichardoN.P.   10 mg at 11/10/24 0932    nicotine (Nicoderm) 21 MG/24HR 21 mg  21 mg Transdermal Daily-0600 MARLO PichardoN.P.   21 mg at 11/14/24 0525    And    nicotine polacrilex (Nicorette) 2 MG piece 2 mg  2 mg Oral Q HOUR PRN MARLO PichardoN.P.           Fluids    Intake/Output Summary (Last 24 hours) at 11/14/2024 0733  Last data filed at 11/14/2024 0400  Gross per 24 hour   Intake 1003.42 ml   Output 650 ml   Net 353.42 ml       Laboratory          Recent Labs     11/12/24  0332 11/13/24  0245 11/14/24  0520   SODIUM 137 139 135   POTASSIUM 4.1 2.7* 5.0   CHLORIDE 103 108 106   CO2 24 17* 22   BUN 15 19 21   CREATININE 1.04 0.97 1.01   MAGNESIUM 2.2 1.7 2.3   CALCIUM 10.9* 8.7 10.3     Recent Labs     11/11/24  0754 11/12/24  0332 11/13/24  0245 11/14/24  0520   ALTSGPT 19  --   --   --    ASTSGOT 23  --   --   --    ALKPHOSPHAT 103*  --   --   --    TBILIRUBIN 0.9  --   --   --    GLUCOSE 101* 112* 198* 109*     Recent Labs     11/11/24  0754 11/12/24  0332 11/13/24  0114 11/14/24  0520   WBC 10.0 8.9 20.7* 10.8   ASTSGOT 23  --   --   --    ALTSGPT 19  --   --   --    ALKPHOSPHAT 103*  --   --   --    TBILIRUBIN 0.9  --    --   --      Recent Labs     11/12/24  0332 11/13/24  0114 11/13/24  0245 11/14/24  0520   RBC 4.92 4.77  --  4.41*   HEMOGLOBIN 16.8 16.1  --  15.3   HEMATOCRIT 47.8 47.4  --  43.6   PLATELETCT 123* 157*  --  121*   PROTHROMBTM  --   --  14.5  --    INR  --   --  1.12  --        Imaging  X-Ray:  I have personally reviewed the images and compared with prior images.  EKG:  I have personally reviewed the images and compared with prior images.  CT:    Reviewed    Assessment/Plan  * Acute stroke due to ischemia (HCC)- (present on admission)  Assessment & Plan  Stroke likely atheroembolic related to left common and internal carotid artery stenosis.  Sinus bradycardia post-intervention.    Plan:  Continue DAPT with aspirin and Plavix  Keep systolic blood pressure 110-140  Neurocheck every 4 hours  Tolerating oral intake  Mobility level 4  PT OT to assist        Bradycardia  Assessment & Plan  Bradycardia 11/12with sinus pauses and prolonged W[place] interval. It appears per telemetry that he may have a second or third egree block. This has not yet occurred while awake.  Overnight there was no sinus pauses or evidence of high-grade AV block  This morning patient is on normal sinus during my exam    Plan:  Cardiology is following  --Outpatient sleep study    Thrombocytopenia (HCC)- (present on admission)  Assessment & Plan  No clear medication offenders.  No evidence of consumption (hemorrhage, DIC), immune-mediated (ITP, TTP/HUS).    Monitor for bleeding  Check INR/fibrinogen  Multivitamin      Polycythemia  Assessment & Plan  Smoking cessation cousnelling  Outpatient sleep study    Smoking  Assessment & Plan  Nicotine PRN  Cessation counselling    Primary hypertension  Assessment & Plan  Titrate antihypertensives to post-procedural goal 110-140  Epinephrine has been off since yesterday  Resume antihypertensive as clinically appropriate           VTE:  Not Indicated  Ulcer: Not Indicated  Lines: None    I have performed a  physical exam and reviewed and updated ROS and Plan today (11/14/2024). In review of yesterday's note (11/13/2024), there are no changes except as documented above.     Discussed patient condition and risk of morbidity and/or mortality with RN, RT, Charge nurse / hot rounds, Patient, and cardiology and neurology    Can be transferred to telemetry  Discussed with hospital medicine

## 2024-11-14 NOTE — PROGRESS NOTES
4 Eyes Skin Assessment Completed by FIOR Vanegas and FIOR Delcid.    Head WDL  Ears WDL  Nose WDL  Mouth WDL  Neck WDL  Breast/Chest WDL  Shoulder Blades WDL  Spine WDL  (R) Arm/Elbow/Hand Redness and Blanching  (L) Arm/Elbow/Hand Redness and Blanching  Abdomen WDL  Groin WDL  Scrotum/Coccyx/Buttocks WDL  (R) Leg WDL  (L) Leg WDL  (R) Heel/Foot/Toe dry  (L) Heel/Foot/Toe dry          Devices In Places Tele Box, Blood Pressure Cuff, and Pulse Ox      Interventions In Place N/A    Possible Skin Injury No    Pictures Uploaded Into Epic N/A  Wound Consult Placed N/A  RN Wound Prevention Protocol Ordered No

## 2024-11-14 NOTE — DISCHARGE PLANNING
Christopher is not requiring 2 of 3 disciplines.  TCC will no longer follow.  Please reach out to myself with any questions.

## 2024-11-14 NOTE — RESPIRATORY CARE
" SMOKING CESSATION EDUCATION by COPD CLINICAL EDUCATOR  11/13/2024 at 4:34 PM by Nima Boone, RRT     Smoking Cessation Intervention and education completed, 4 minutes spent on smoking cessation education with patient.  Provided smoking cessation packet with \"Tips to Quit\" and brochure for \"Free Smoking Cessation Classes\".                  "

## 2024-11-14 NOTE — THERAPY
Physical Therapy   Initial Evaluation     Patient Name: Christopher Brown  Age:  64 y.o., Sex:  male  Medical Record #: [id number]  Today's Date: 11/13/2024     Precautions  Comments: RUE deficits    Assessment  Patient is 64 y.o. male that presented to acute with complaints of RUE weakness. CT revealed L common carotid and internal carotid artery stenosis; patient is s/p L ICA stent placement. RN reported patient with asymptomatic bradycardia during hospitalization.  PMHx significant for tobacco abuse, HTN.    He presented to PT with functional weakness and impaired gross and fine motor control of RUE as detailed below. He was able to perform functional mobility without AD or physical assist. Additional time required for education regarding neurological injury and recovery, intentional and repeated use of RUE with visual feedback and error correction. Provided education regarding therapeutic activities to address deficits including: coloring books; writing; squeeze brain; reach, grasp, and manipulation of objects with RUE. Patient demonstrated understanding of education.    Patient will not be actively followed for physical therapy services at this time, however may be seen if requested by physician for 1 more visit within 30 days to address any discharge or equipment needs.      Plan    Physical Therapy Initial Treatment Plan   Duration: Evaluation only    DC Equipment Recommendations: None  Discharge Recommendations: Recommend outpatient physical therapy services to address higher level deficits       Subjective    RN cleared patient for therapy; patient received in chair, agreeable to assessment     Objective       11/13/24 1549   Time In/Time Out   Therapy Start Time 1528   Therapy End Time 1549   Total Therapy Time 21   Charge Group   PT Evaluation PT Evaluation Mod   PT Self Care / Home Evaluation (Units) 1   Total Time Spent   PT Total Time Yes   PT Evaluation Time Spent (Mins) 12   PT Self Care/Home Evaluation  Time Spent (Mins) 9   PT Total Time Spent (Calculated) 21   Initial Contact Note    Initial Contact Note Order Received and Verified, Evaluation Only - Patient Does Not Require Further Acute Physical Therapy at this Time.  However, May Benefit from Post Acute Therapy for Higher Level Functional Deficits.   Precautions   Comments RUE deficits   Vitals   O2 (LPM) 0   O2 Delivery Device None - Room Air   Pain 0 - 10 Group   Therapist Pain Assessment   (no pain complaint during session)   Prior Living Situation   Prior Services None   Housing / Facility 1 Story House   Steps Into Home 8   Steps In Home 0   Rail Both Rail (Steps into Home)   Equipment Owned Front-Wheel Walker   Lives with - Patient's Self Care Capacity Alone and Able to Care For Self   Comments Patient reported plan to DC to step mother's home in [place] where he will have assist as needed   Prior Level of Functional Mobility   Bed Mobility Independent   Transfer Status Independent   Ambulation Independent   Ambulation Distance community   Assistive Devices Used None   Stairs Independent   History of Falls   History of Falls No   Cognition    Cognition / Consciousness WDL   Level of Consciousness Alert   Comments pleasant, cooperative, demonstrated understanding of education   Active ROM Upper Body   Dominant Hand Right   Comments gross deficits RUE, able to achieve full ROM with cueing to attend/attempt full ROM. LUE WFL   Strength Upper Body   Comments as above; RUE grossly 3+/5, LUE 4+/5   Sensation Upper Body   Comments patient reported some deficits RUE but improving   Active ROM Lower Body    Active ROM Lower Body  WDL   Strength Lower Body   Lower Body Strength  WDL   Sensation Lower Body   Lower Extremity Sensation   WDL   Neurological Concerns   Neurological Concerns Yes   Comments given medical dx and presentation   Coordination Upper Body   Coordination X   Comments gross and fine motor deficits demonstrated RUE; LUE WFL   Coordination Lower  Body    Coordination Lower Body  WDL   Balance Assessment   Sitting Balance (Static) Good   Sitting Balance (Dynamic) Good   Standing Balance (Static) Fair +   Standing Balance (Dynamic) Fair +   Weight Shift Sitting Good   Weight Shift Standing Good   Comments no AD, no LOB   Bed Mobility    Comments in chair pre and post; appeared capable, reported no concerns   Gait Analysis   Gait Level Of Assist Supervised   Assistive Device None   Distance (Feet) 500   # of Times Distance was Traveled 1   Deviation No deviation   # of Stairs Climbed 0  (appeared capable, reported no concerns, demonstrated adequate strength, ROM, balance)   Weight Bearing Status no restrictions   Vision Deficits Impacting Mobility reported improvement in visual clarity following stent placement   Comments patient reported he is at baseline with regards to ambulation   Functional Mobility   Sit to Stand Supervised   Bed, Chair, Wheelchair Transfer Supervised   Toilet Transfers Supervised   Transfer Method Stand Step   ICU Target Mobility Level   ICU Mobility - Targeted Level Level 4   6 Clicks Assessment - How much HELP from from another person do you currently need... (If the patient hasn't done an activity recently, how much help from another person do you think he/she would need if he/she tried?)   Turning from your back to your side while in a flat bed without using bedrails? 4   Moving from lying on your back to sitting on the side of a flat bed without using bedrails? 4   Moving to and from a bed to a chair (including a wheelchair)? 4   Standing up from a chair using your arms (e.g., wheelchair, or bedside chair)? 4   Walking in hospital room? 4   Climbing 3-5 steps with a railing? 4   6 clicks Mobility Score 24   Education Group   Education Provided Role of Physical Therapist   Role of Physical Therapist Patient Response Patient;Acceptance;Explanation;Verbal Demonstration   CVA Patient Response  Patient;Acceptance;Explanation;Demonstration;Verbal Demonstration;Action Demonstration   Physical Therapy Initial Treatment Plan    Duration Evaluation only   Problem List    Problems Functional ROM Deficit;Functional Strength Deficit;Impaired Coordination   Anticipated Discharge Equipment and Recommendations   DC Equipment Recommendations None   Discharge Recommendations Recommend outpatient physical therapy services to address higher level deficits   Interdisciplinary Plan of Care Collaboration   IDT Collaboration with  Nursing   Patient Position at End of Therapy Seated;Call Light within Reach;Tray Table within Reach;Phone within Reach   Collaboration Comments RN aware of visit, response   Session Information   Date / Session Number  11/13 - eval only

## 2024-11-14 NOTE — ASSESSMENT & PLAN NOTE
Requiring IV epinephrine drip in the ICU which has now been turned off though he remains bradycardic  Cardiology has consulted  He has varying AV blocks type I and II  Continuous telemetry monitoring

## 2024-11-15 ENCOUNTER — PHARMACY VISIT (OUTPATIENT)
Dept: PHARMACY | Facility: MEDICAL CENTER | Age: 64
End: 2024-11-15
Payer: COMMERCIAL

## 2024-11-15 VITALS
HEART RATE: 67 BPM | HEIGHT: 74 IN | OXYGEN SATURATION: 95 % | SYSTOLIC BLOOD PRESSURE: 169 MMHG | DIASTOLIC BLOOD PRESSURE: 93 MMHG | WEIGHT: 236.55 LBS | RESPIRATION RATE: 17 BRPM | BODY MASS INDEX: 30.36 KG/M2 | TEMPERATURE: 98.6 F

## 2024-11-15 PROCEDURE — A9270 NON-COVERED ITEM OR SERVICE: HCPCS | Performed by: NURSE PRACTITIONER

## 2024-11-15 PROCEDURE — A9270 NON-COVERED ITEM OR SERVICE: HCPCS | Performed by: STUDENT IN AN ORGANIZED HEALTH CARE EDUCATION/TRAINING PROGRAM

## 2024-11-15 PROCEDURE — A9270 NON-COVERED ITEM OR SERVICE: HCPCS

## 2024-11-15 PROCEDURE — 700102 HCHG RX REV CODE 250 W/ 637 OVERRIDE(OP)

## 2024-11-15 PROCEDURE — A9270 NON-COVERED ITEM OR SERVICE: HCPCS | Performed by: PSYCHIATRY & NEUROLOGY

## 2024-11-15 PROCEDURE — 700102 HCHG RX REV CODE 250 W/ 637 OVERRIDE(OP): Performed by: EMERGENCY MEDICINE

## 2024-11-15 PROCEDURE — 700102 HCHG RX REV CODE 250 W/ 637 OVERRIDE(OP): Performed by: STUDENT IN AN ORGANIZED HEALTH CARE EDUCATION/TRAINING PROGRAM

## 2024-11-15 PROCEDURE — 700102 HCHG RX REV CODE 250 W/ 637 OVERRIDE(OP): Performed by: NURSE PRACTITIONER

## 2024-11-15 PROCEDURE — 700102 HCHG RX REV CODE 250 W/ 637 OVERRIDE(OP): Performed by: PSYCHIATRY & NEUROLOGY

## 2024-11-15 PROCEDURE — 99239 HOSP IP/OBS DSCHRG MGMT >30: CPT | Performed by: STUDENT IN AN ORGANIZED HEALTH CARE EDUCATION/TRAINING PROGRAM

## 2024-11-15 PROCEDURE — A9270 NON-COVERED ITEM OR SERVICE: HCPCS | Performed by: EMERGENCY MEDICINE

## 2024-11-15 PROCEDURE — RXMED WILLOW AMBULATORY MEDICATION CHARGE: Performed by: STUDENT IN AN ORGANIZED HEALTH CARE EDUCATION/TRAINING PROGRAM

## 2024-11-15 RX ORDER — ACETAMINOPHEN 325 MG/1
650 TABLET ORAL EVERY 4 HOURS PRN
Status: CANCELLED | OUTPATIENT
Start: 2024-11-15

## 2024-11-15 RX ORDER — SODIUM CHLORIDE 9 MG/ML
INJECTION, SOLUTION INTRAVENOUS CONTINUOUS
Status: CANCELLED | OUTPATIENT
Start: 2024-11-15

## 2024-11-15 RX ORDER — ACETAMINOPHEN 650 MG/1
650 SUPPOSITORY RECTAL EVERY 4 HOURS PRN
Status: CANCELLED | OUTPATIENT
Start: 2024-11-15

## 2024-11-15 RX ORDER — CLOPIDOGREL BISULFATE 75 MG/1
75 TABLET ORAL DAILY
Qty: 87 TABLET | Refills: 0 | Status: SHIPPED | OUTPATIENT
Start: 2024-11-16 | End: 2025-02-11

## 2024-11-15 RX ORDER — HYDROXYZINE HYDROCHLORIDE 25 MG/1
25 TABLET, FILM COATED ORAL ONCE
Status: COMPLETED | OUTPATIENT
Start: 2024-11-15 | End: 2024-11-15

## 2024-11-15 RX ORDER — ASPIRIN 81 MG/1
81 TABLET, CHEWABLE ORAL DAILY
Qty: 100 TABLET | Refills: 3 | Status: SHIPPED | OUTPATIENT
Start: 2024-11-16

## 2024-11-15 RX ORDER — ONDANSETRON 4 MG/1
4 TABLET, ORALLY DISINTEGRATING ORAL EVERY 4 HOURS PRN
Status: CANCELLED | OUTPATIENT
Start: 2024-11-15

## 2024-11-15 RX ORDER — LORAZEPAM 2 MG/ML
2 INJECTION INTRAMUSCULAR
Status: CANCELLED | OUTPATIENT
Start: 2024-11-15

## 2024-11-15 RX ORDER — ONDANSETRON 2 MG/ML
4 INJECTION INTRAMUSCULAR; INTRAVENOUS EVERY 4 HOURS PRN
Status: CANCELLED | OUTPATIENT
Start: 2024-11-15

## 2024-11-15 RX ORDER — ATORVASTATIN CALCIUM 40 MG/1
40 TABLET, FILM COATED ORAL EVERY EVENING
Qty: 90 TABLET | Refills: 3 | Status: SHIPPED | OUTPATIENT
Start: 2024-11-15

## 2024-11-15 RX ADMIN — HYDROXYZINE HYDROCHLORIDE 25 MG: 25 TABLET, FILM COATED ORAL at 00:49

## 2024-11-15 RX ADMIN — NICOTINE TRANSDERMAL SYSTEM 21 MG: 21 PATCH, EXTENDED RELEASE TRANSDERMAL at 05:18

## 2024-11-15 RX ADMIN — CLOPIDOGREL BISULFATE 75 MG: 75 TABLET ORAL at 05:19

## 2024-11-15 RX ADMIN — ASPIRIN 81 MG: 81 TABLET, CHEWABLE ORAL at 05:19

## 2024-11-15 RX ADMIN — THERA TABS 1 TABLET: TAB at 05:19

## 2024-11-15 ASSESSMENT — PAIN DESCRIPTION - PAIN TYPE: TYPE: ACUTE PAIN

## 2024-11-15 ASSESSMENT — FIBROSIS 4 INDEX: FIB4 SCORE: 2.83

## 2024-11-15 NOTE — DISCHARGE INSTRUCTIONS
"Please follow up with primary care physician and obtain referrals for outpatient physical therapy and occupational therapy.  Please follow up with cardiology.  Please follow up with neurology.  Please take and log your blood pressures and present them to your future follow up appointments.   Please call [phone] to apply for medicare part D, please refer to print outs for additional information.      Ischemic Stroke  Discharge Instructions    You experienced an Ischemic Stroke.  Ischemic stroke is the most common type of stroke and happens when an artery in the brain becomes blocked by a plaque fragment or blood clot. Typically, these blockages travel from the heart or larger arteries that supply the brain.  The brain needs a constant supply of blood, which carries oxygen and nutrients it needs to function.  A stroke occurs when one of these arteries to the brain is either blocked or bursts. As a result, part of the brain does not get the blood it needs, so it starts to die.         Stroke Risk Factors    You are at increased risk of having another stroke event.  See your Patient Stroke Guide to help reduce your stroke risk. These are your specific risk factors:  Age - Over 55  Carotid Stenosis   Gender - Men are at a higher risk than women  High blood pressure  Smoking or Tobacco Use     Get help right away if you have any signs of a stroke.  \"BE FAST\" is an easy way to remember the main warning signs of a stroke:  B - Balance. Dizziness, sudden trouble walking, or loss of balance.  E - Eyes. Trouble seeing or a change in how you see.  F - Face. Sudden weakness or loss of feeling in the face. The face or eyelid may droop on one side.  A - Arms. Weakness or loss of feeling in an arm. This happens all of a sudden and most often on one side of the body.  S - Speech. Sudden trouble speaking, slurred speech, or trouble understanding what people say.  T - Time. Time to call emergency services. Write down what time " symptoms started.

## 2024-11-15 NOTE — DISCHARGE SUMMARY
Discharge Summary    CHIEF COMPLAINT ON ADMISSION  No chief complaint on file.      Reason for Admission  Acute stroke     Admission Date  11/9/2024    CODE STATUS  Full Code    HPI & HOSPITAL COURSE    Christopher Brown is a 64-year-old male with PMHx HTN, current tobacco abuse.  Admitted 11/9 for right upper extremity weakness.    Per history-patient presented to an outside facility for right upper extremity weakness that started on Wednesday 11/6.  Patient did not seek medical attention until 11/9.      CTA head showing low-density in the left parieto-occipital region suggesting an area of previous infarction.  CTA head and neck: High-grade stenosis of left distal carotid artery with moderate stenosis of the internal carotid artery.    MRI brain: Acute and subacute left posterior frontal and parietal infarcts.  No definitive evidence of hemorrhagic transformation.    Neurology was consulted upon arrival.  Neurology recommending DAPT therapy for 3 months with an end date of 2/11/2025 and then aspirin as monotherapy to follow.      Patient underwent left ICA stent on 11/12.  Postoperatively patient was monitored in the ICU.  ICU admission was complicated by bradycardia requiring epinephrine drip.  Cardiology was consulted and recommended continue telemetry monitoring.  At this point pacemaker was not recommended as patient's heart block and pauses occur overnight without symptoms.  Encourage patient to avoid beta-blocker therapy in the future as he at baseline has a heart rate of 40-50.    PT and OT recommending outpatient therapies.  Referrals have been placed.  Additionally, patient has been discharged with a 90-day supply of his medications.    Therefore, he is discharged in good and stable condition to home with close outpatient follow-up.    The patient met 2-midnight criteria for an inpatient stay at the time of discharge.    Discharge Date  11/15/2024    FOLLOW UP ITEMS POST DISCHARGE  Follow-up with  cardiology 1 week  Follow-up with neurology as scheduled  Follow-up with primary care physician 2 to 3 days    DISCHARGE DIAGNOSES  Principal Problem:    Acute stroke due to ischemia (HCC) (POA: Yes)  Active Problems:    Primary hypertension (POA: Unknown)    Smoking (POA: Unknown)    Polycythemia (POA: Unknown)    Thrombocytopenia (HCC) (POA: Yes)    Bradycardia (POA: Unknown)    Carotid stenosis, left (POA: Yes)  Resolved Problems:    Stenosis of left internal carotid artery (POA: Unknown)    Shock (HCC) (POA: Unknown)      FOLLOW UP  No future appointments.  No follow-up provider specified.    MEDICATIONS ON DISCHARGE     Medication List        START taking these medications        Instructions   aspirin 81 MG Chew chewable tablet  Start taking on: November 16, 2024  Commonly known as: Asa   Chew 1 Tablet every day.  Dose: 81 mg     atorvastatin 40 MG Tabs  Commonly known as: Lipitor   Take 1 Tablet by mouth every evening.  Dose: 40 mg     clopidogrel 75 MG Tabs  Start taking on: November 16, 2024  Commonly known as: Plavix   Take 1 Tablet by mouth every day for 87 days.  Dose: 75 mg              Allergies  No Known Allergies    DIET  Orders Placed This Encounter   Procedures    Diet Order Diet: Cardiac     Standing Status:   Standing     Number of Occurrences:   1     Order Specific Question:   Diet:     Answer:   Cardiac [6]       ACTIVITY  As tolerated.  Weight bearing as tolerated    CONSULTATIONS  Neurology  Critical care  Cardiology    PROCEDURES  Left carotid artery stent 11/15    LABORATORY  Lab Results   Component Value Date    SODIUM 135 11/14/2024    POTASSIUM 5.0 11/14/2024    CHLORIDE 106 11/14/2024    CO2 22 11/14/2024    GLUCOSE 109 (H) 11/14/2024    BUN 21 11/14/2024    CREATININE 1.01 11/14/2024        Lab Results   Component Value Date    WBC 10.8 11/14/2024    HEMOGLOBIN 15.3 11/14/2024    HEMATOCRIT 43.6 11/14/2024    PLATELETCT 121 (L) 11/14/2024        Total time of the discharge process  exceeds 49 minutes.

## 2024-11-15 NOTE — PROGRESS NOTES
Discharge orders received.  Patient arrived to the discharge lounge.  PIV removed by discharge RN. Meds to beds medications verified by discharge RN, bag of medications given to patient.  Instructions given, medications reviewed and general discharge education provided to patient.  Follow up appointments discussed.  Patient verbalized understanding of dc instructions and prescriptions.  Patient signed discharge instructions.  Patient verbalized he had all belongings with him, bag of cash was returned by  Jose M DEL CASTILLO Patient ambulated with steady gait from discharge lounge to private vehicle. Patient left via car with family to home in stable condition.

## 2024-11-15 NOTE — PROGRESS NOTES
Monitor summary: SB 35-43, NC -0.15, QRS -0.10, QT -0.45, with (O) PVCs and rare bigeminal PVCs with HR down to 30 per strip from the monitor room.

## 2024-11-15 NOTE — CARE PLAN
The patient is Stable - Low risk of patient condition declining or worsening    Shift Goals  Clinical Goals: monitor neuro status  Patient Goals: home  Family Goals: updates    Progress made toward(s) clinical / shift goals:    Problem: Optimal Care of the Stroke Patient  Goal: Optimal emergency care for the stroke patient  Outcome: Progressing     Problem: Neuro Status  Goal: Neuro status will remain stable or improve  Outcome: Progressing   Patient aox4. RUE weakness with numbness and tingling.   Problem: Hemodynamic Monitoring  Goal: Patient's hemodynamics, fluid balance and neurologic status will be stable or improve  Outcome: Progressing     Problem: Respiratory - Stroke Patient  Goal: Patient will achieve/maintain optimum respiratory rate/effort  Outcome: Progressing   Patient on room air. Denies SOB.   Problem: Urinary Elimination  Goal: Establish and maintain regular urinary output  Outcome: Progressing   Patient able to void.   Problem: Mobility - Stroke  Goal: Patient's capacity to carry out activities will improve  Outcome: Progressing   Patient able to ambulate SBA. Tolerates well.   Problem: Pain - Standard  Goal: Alleviation of pain or a reduction in pain to the patient’s comfort goal  Outcome: Progressing   Denies pain.   Problem: Fall Risk  Goal: Patient will remain free from falls  Outcome: Progressing   Bed alarm in place. Bed locked and in lowest position.

## 2024-12-19 ENCOUNTER — TELEPHONE (OUTPATIENT)
Dept: NEUROLOGY | Facility: MEDICAL CENTER | Age: 64
End: 2024-12-19
Payer: MEDICARE

## 2024-12-19 NOTE — TELEPHONE ENCOUNTER
Post-Discharge Follow-up Call.    Patient does not want to schedule a stroke bridge appointment.  He is working with his PCP to get a neuro referral closer to his home.  He has been removed from the waitlist.